# Patient Record
Sex: FEMALE | Race: OTHER | NOT HISPANIC OR LATINO | Employment: UNEMPLOYED | URBAN - METROPOLITAN AREA
[De-identification: names, ages, dates, MRNs, and addresses within clinical notes are randomized per-mention and may not be internally consistent; named-entity substitution may affect disease eponyms.]

---

## 2024-02-14 ENCOUNTER — HOSPITAL ENCOUNTER (INPATIENT)
Facility: HOSPITAL | Age: 69
LOS: 1 days | Discharge: HOME | DRG: 083 | End: 2024-02-15
Attending: EMERGENCY MEDICINE | Admitting: SURGERY

## 2024-02-14 ENCOUNTER — APPOINTMENT (EMERGENCY)
Dept: RADIOLOGY | Facility: HOSPITAL | Age: 69
DRG: 083 | End: 2024-02-14

## 2024-02-14 ENCOUNTER — APPOINTMENT (INPATIENT)
Dept: RADIOLOGY | Facility: HOSPITAL | Age: 69
DRG: 083 | End: 2024-02-14

## 2024-02-14 DIAGNOSIS — S06.6XAA SUBARACHNOID HEMATOMA (HCC): Primary | ICD-10-CM

## 2024-02-14 DIAGNOSIS — T14.8XXA CONTUSION: ICD-10-CM

## 2024-02-14 DIAGNOSIS — S09.90XA HEAD INJURY: ICD-10-CM
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
## 2024-02-14 PROBLEM — F03.90 DEMENTIA (HCC): Status: ACTIVE | Noted: 2024-02-14

## 2024-02-14 PROBLEM — I61.9 ICH (INTRACEREBRAL HEMORRHAGE) (HCC): Status: ACTIVE | Noted: 2024-02-14

## 2024-02-14 PROBLEM — I60.9 SAH (SUBARACHNOID HEMORRHAGE) (HCC): Status: ACTIVE | Noted: 2024-02-14

## 2024-02-14 LAB
ALBUMIN SERPL BCP-MCNC: 4.4 G/DL (ref 3.5–5)
ALP SERPL-CCNC: 56 U/L (ref 34–104)
ALT SERPL W P-5'-P-CCNC: 29 U/L (ref 7–52)
ANION GAP SERPL CALCULATED.3IONS-SCNC: 8 MMOL/L
AST SERPL W P-5'-P-CCNC: 26 U/L (ref 13–39)
BASOPHILS # BLD AUTO: 0.05 THOUSANDS/ÂΜL (ref 0–0.1)
BASOPHILS NFR BLD AUTO: 0 % (ref 0–1)
BILIRUB SERPL-MCNC: 0.57 MG/DL (ref 0.2–1)
BUN SERPL-MCNC: 13 MG/DL (ref 5–25)
CA-I BLD-SCNC: 1.07 MMOL/L (ref 1.12–1.32)
CALCIUM SERPL-MCNC: 9.5 MG/DL (ref 8.4–10.2)
CHLORIDE SERPL-SCNC: 103 MMOL/L (ref 96–108)
CO2 SERPL-SCNC: 28 MMOL/L (ref 21–32)
CREAT SERPL-MCNC: 0.49 MG/DL (ref 0.6–1.3)
EOSINOPHIL # BLD AUTO: 0 THOUSAND/ÂΜL (ref 0–0.61)
EOSINOPHIL NFR BLD AUTO: 0 % (ref 0–6)
ERYTHROCYTE [DISTWIDTH] IN BLOOD BY AUTOMATED COUNT: 12.5 % (ref 11.6–15.1)
GFR SERPL CREATININE-BSD FRML MDRD: 101 ML/MIN/1.73SQ M
GLUCOSE SERPL-MCNC: 130 MG/DL (ref 65–140)
HCT VFR BLD AUTO: 42.6 % (ref 34.8–46.1)
HGB BLD-MCNC: 13.4 G/DL (ref 11.5–15.4)
IMM GRANULOCYTES # BLD AUTO: 0.06 THOUSAND/UL (ref 0–0.2)
IMM GRANULOCYTES NFR BLD AUTO: 0 % (ref 0–2)
LYMPHOCYTES # BLD AUTO: 0.98 THOUSANDS/ÂΜL (ref 0.6–4.47)
LYMPHOCYTES NFR BLD AUTO: 5 % (ref 14–44)
MAGNESIUM SERPL-MCNC: 2.2 MG/DL (ref 1.9–2.7)
MCH RBC QN AUTO: 30.4 PG (ref 26.8–34.3)
MCHC RBC AUTO-ENTMCNC: 31.5 G/DL (ref 31.4–37.4)
MCV RBC AUTO: 97 FL (ref 82–98)
MONOCYTES # BLD AUTO: 0.33 THOUSAND/ÂΜL (ref 0.17–1.22)
MONOCYTES NFR BLD AUTO: 2 % (ref 4–12)
NEUTROPHILS # BLD AUTO: 17.03 THOUSANDS/ÂΜL (ref 1.85–7.62)
NEUTS SEG NFR BLD AUTO: 93 % (ref 43–75)
NRBC BLD AUTO-RTO: 0 /100 WBCS
PHOSPHATE SERPL-MCNC: 3.4 MG/DL (ref 2.3–4.1)
PLATELET # BLD AUTO: 257 THOUSANDS/UL (ref 149–390)
PMV BLD AUTO: 9.8 FL (ref 8.9–12.7)
POTASSIUM SERPL-SCNC: 3.9 MMOL/L (ref 3.5–5.3)
PROT SERPL-MCNC: 7.7 G/DL (ref 6.4–8.4)
RBC # BLD AUTO: 4.41 MILLION/UL (ref 3.81–5.12)
SODIUM SERPL-SCNC: 139 MMOL/L (ref 135–147)
WBC # BLD AUTO: 18.45 THOUSAND/UL (ref 4.31–10.16)

## 2024-02-14 PROCEDURE — G1004 CDSM NDSC: HCPCS

## 2024-02-14 PROCEDURE — 70498 CT ANGIOGRAPHY NECK: CPT

## 2024-02-14 PROCEDURE — 83735 ASSAY OF MAGNESIUM: CPT

## 2024-02-14 PROCEDURE — 84100 ASSAY OF PHOSPHORUS: CPT

## 2024-02-14 PROCEDURE — 99291 CRITICAL CARE FIRST HOUR: CPT

## 2024-02-14 PROCEDURE — 92610 EVALUATE SWALLOWING FUNCTION: CPT

## 2024-02-14 PROCEDURE — 93005 ELECTROCARDIOGRAM TRACING: CPT

## 2024-02-14 PROCEDURE — 72125 CT NECK SPINE W/O DYE: CPT

## 2024-02-14 PROCEDURE — 82607 VITAMIN B-12: CPT | Performed by: NURSE PRACTITIONER

## 2024-02-14 PROCEDURE — 99255 IP/OBS CONSLTJ NEW/EST HI 80: CPT | Performed by: NURSE PRACTITIONER

## 2024-02-14 PROCEDURE — 85025 COMPLETE CBC W/AUTO DIFF WBC: CPT

## 2024-02-14 PROCEDURE — 80053 COMPREHEN METABOLIC PANEL: CPT

## 2024-02-14 PROCEDURE — 99255 IP/OBS CONSLTJ NEW/EST HI 80: CPT | Performed by: NEUROLOGICAL SURGERY

## 2024-02-14 PROCEDURE — 99291 CRITICAL CARE FIRST HOUR: CPT | Performed by: EMERGENCY MEDICINE

## 2024-02-14 PROCEDURE — 84443 ASSAY THYROID STIM HORMONE: CPT | Performed by: NURSE PRACTITIONER

## 2024-02-14 PROCEDURE — 36415 COLL VENOUS BLD VENIPUNCTURE: CPT

## 2024-02-14 PROCEDURE — 70496 CT ANGIOGRAPHY HEAD: CPT

## 2024-02-14 PROCEDURE — 99223 1ST HOSP IP/OBS HIGH 75: CPT | Performed by: SURGERY

## 2024-02-14 PROCEDURE — NC001 PR NO CHARGE: Performed by: SURGERY

## 2024-02-14 PROCEDURE — 82330 ASSAY OF CALCIUM: CPT

## 2024-02-14 PROCEDURE — 70450 CT HEAD/BRAIN W/O DYE: CPT

## 2024-02-14 RX ORDER — LEVETIRACETAM 500 MG/1
500 TABLET ORAL EVERY 12 HOURS SCHEDULED
Status: DISCONTINUED | OUTPATIENT
Start: 2024-02-14 | End: 2024-02-15 | Stop reason: HOSPADM

## 2024-02-14 RX ORDER — ACETAMINOPHEN 325 MG/1
650 TABLET ORAL EVERY 6 HOURS PRN
Status: DISCONTINUED | OUTPATIENT
Start: 2024-02-14 | End: 2024-02-15 | Stop reason: HOSPADM

## 2024-02-14 RX ORDER — HYDROMORPHONE HCL IN WATER/PF 6 MG/30 ML
0.2 PATIENT CONTROLLED ANALGESIA SYRINGE INTRAVENOUS
Status: DISCONTINUED | OUTPATIENT
Start: 2024-02-14 | End: 2024-02-15 | Stop reason: HOSPADM

## 2024-02-14 RX ORDER — OXYCODONE HYDROCHLORIDE 5 MG/1
5 TABLET ORAL EVERY 4 HOURS PRN
Status: DISCONTINUED | OUTPATIENT
Start: 2024-02-14 | End: 2024-02-15 | Stop reason: HOSPADM

## 2024-02-14 RX ORDER — ACETAMINOPHEN 325 MG/1
975 TABLET ORAL ONCE
Status: COMPLETED | OUTPATIENT
Start: 2024-02-14 | End: 2024-02-14

## 2024-02-14 RX ORDER — ONDANSETRON 4 MG/1
4 TABLET, ORALLY DISINTEGRATING ORAL ONCE
Status: COMPLETED | OUTPATIENT
Start: 2024-02-14 | End: 2024-02-14

## 2024-02-14 RX ORDER — ONDANSETRON 2 MG/ML
4 INJECTION INTRAMUSCULAR; INTRAVENOUS EVERY 4 HOURS PRN
Status: DISCONTINUED | OUTPATIENT
Start: 2024-02-14 | End: 2024-02-15 | Stop reason: HOSPADM

## 2024-02-14 RX ORDER — SERTRALINE HYDROCHLORIDE 25 MG/1
25 TABLET, FILM COATED ORAL DAILY
Status: DISCONTINUED | OUTPATIENT
Start: 2024-02-15 | End: 2024-02-15 | Stop reason: HOSPADM

## 2024-02-14 RX ADMIN — LEVETIRACETAM 500 MG: 500 TABLET, FILM COATED ORAL at 14:05

## 2024-02-14 RX ADMIN — LEVETIRACETAM 500 MG: 500 TABLET, FILM COATED ORAL at 20:24

## 2024-02-14 RX ADMIN — ONDANSETRON 4 MG: 4 TABLET, ORALLY DISINTEGRATING ORAL at 10:53

## 2024-02-14 RX ADMIN — IOHEXOL 100 ML: 350 INJECTION, SOLUTION INTRAVENOUS at 14:30

## 2024-02-14 RX ADMIN — ACETAMINOPHEN 975 MG: 325 TABLET, FILM COATED ORAL at 10:53

## 2024-02-14 NOTE — PROGRESS NOTES
Cervical Collar Clearance:    The patient had a CT scan of the cervical spine demonstrating no acute injury. On exam, the patient had no midline point tenderness or paresthesias/numbness/weakness in the extremities. The patient had full range of motion (was then able to flex, extend, and rotate head laterally) without pain. There were no distracting injuries and the patient was not intoxicated.      The patient's cervical spine was cleared radiologically and clinically. Cervical collar removed at this time.     Terrance Ko MD  2/14/2024 12:58 PM

## 2024-02-14 NOTE — CONSULTS
[facility]  Consult  Name: Jessica Vincent 68 y.o. female I MRN: [id number]  [facility] I Date of Admission: 2/14/2024   Date of Service: 2/14/2024 I Hospital Day: 0    Inpatient consult to Neurosurgery  Consult performed by: Amador Simon PA-C  Consult ordered by: Terrance Ko MD      Assessment/Plan   SAH (subarachnoid hemorrhage) ([facility])  Assessment & Plan  Patient presented s/p slip and fall on ice backwards  No AC/AP   +Head strike without LOC    Imaging:   CT head 2/14/2024: Acute right frontotemporal subarachnoid hemorrhage  CTA head and neck 2/14/2024: similar small volume SAH. No vasospasm identified. Negative CTA for aneurysm AVM, LVO, dissection or stenosis.    Plan:   ongoing frequent neurological checks.   Recommend STAT CT head for decline in GCS >2 points in 1 hour.   Recommended STAT CTA to rule out aneurysm/vascular etiology given location of hemorrhage    Recommend repeat CT head tomorrow for stability.   Keppra for seizure ppx per trauma team   DVT ppx: SCD's only. Recommend stable CT head prior to initiation of pharm DVT ppx.  No AC/AP reported by family   PT/OT evaluation.   Ongoing medical management and pain control per primary team   CM following for dispo planning  Neurosurgery will continue to follow. Call with questions or concerns.            History of Present Illness   HPI: Jessica Vincent is a 68 y.o. female with PMH including dementia who presents after a witnessed fall. Patient lives with family who are at bedside today. She was ambulating in the hallway when she fell backwards after she slipped striking the back of her head on the ground. She has a small scalp abrasion with petechial bleeding and underlying small hematoma. Following the fall she was noted to have a headache and nausea with small amount of vomiting. She has baseline dementia which was reported to be present for about the last year since the death of her . She has no  other significant medical issues at this time. She is pleasantly confused with family providing some translation. She is still somewhat independent at home with her activities.    Prior to the fall patient did not report any issues or symptoms including a headache. She does have a few prior falls but does not use a cane or walker for ambulation at baseline. Family denies any urinary symptoms including incontinence.     Review of Systems   Constitutional:  Negative for activity change and fatigue.   HENT: Negative.     Respiratory: Negative.     Cardiovascular: Negative.    Gastrointestinal:  Positive for nausea and vomiting (previously. non since presentation).   Musculoskeletal: Negative.    Skin:  Positive for wound (scalp abrasion with superficial bleeding).   Neurological:  Positive for headaches.   Psychiatric/Behavioral:  Negative for agitation, behavioral problems and confusion.        Historical Information   Past Medical History:   Diagnosis Date    Dementia (HCC)     Difficulty in swallowing      History reviewed. No pertinent surgical history.  Social History     Substance and Sexual Activity   Alcohol Use None     Social History     Substance and Sexual Activity   Drug Use Not on file     Social History     Tobacco Use   Smoking Status Never   Smokeless Tobacco Never     Family History   Problem Relation Age of Onset    Dementia Mother        Meds/Allergies   all current active meds have been reviewed, current meds:   Current Facility-Administered Medications   Medication Dose Route Frequency    acetaminophen (TYLENOL) tablet 650 mg  650 mg Oral Q6H PRN    HYDROmorphone HCl (DILAUDID) injection 0.2 mg  0.2 mg Intravenous Q3H PRN    levETIRAcetam (KEPPRA) tablet 500 mg  500 mg Oral Q12H TOY    ondansetron (ZOFRAN) injection 4 mg  4 mg Intravenous Q4H PRN    oxyCODONE (ROXICODONE) IR tablet 5 mg  5 mg Oral Q4H PRN    oxyCODONE (ROXICODONE) split tablet 2.5 mg  2.5 mg Oral Q4H PRN    [START ON 2/15/2024]  sertraline (ZOLOFT) tablet 25 mg  25 mg Oral Daily   , and PTA meds:   None     No Known Allergies    Objective   I/O       None            Physical Exam  Constitutional:       Appearance: Normal appearance. She is well-developed.   HENT:      Head: Normocephalic and atraumatic.   Eyes:      Extraocular Movements: Extraocular movements intact and EOM normal.      Pupils: Pupils are equal, round, and reactive to light.   Neck:      Vascular: No JVD.      Trachea: No tracheal deviation.   Cardiovascular:      Rate and Rhythm: Normal rate.   Musculoskeletal:         General: No tenderness or deformity. Normal range of motion.      Cervical back: Normal range of motion and neck supple.   Skin:     General: Skin is warm and dry.   Neurological:      Mental Status: She is alert. Mental status is at baseline. She is disoriented.      Cranial Nerves: No cranial nerve deficit.      Sensory: No sensory deficit.      Motor: No weakness.      Deep Tendon Reflexes: Reflexes are normal and symmetric.   Psychiatric:         Speech: Speech normal.         Behavior: Behavior normal.         Thought Content: Thought content normal.       Neurologic Exam     Mental Status   Attention: decreased. Concentration: decreased.   Speech: speech is normal   Level of consciousness: alert  Knowledge: good and poor. Unable to perform simple calculations.   Abnormal comprehension.   Patient oriented to name and location being the hospital only. Not oriented to time or  although can sate her age accurately.      Cranial Nerves     CN III, IV, VI   Pupils are equal, round, and reactive to light.  Extraocular motions are normal.   Upgaze: normal  Downgaze: normal    CN V   Facial sensation intact.     CN VII   Facial expression full, symmetric.     CN VIII   CN VIII normal.   Hearing: intact    CN XII   CN XII normal.   Tongue deviation: none    Motor Exam   Muscle bulk: normal  Right arm tone: normal  Left arm tone: normal  Right leg tone:  "normal  Left leg tone: normalStrength grossly intact. Globally weak with 4/5 strength throughout      Sensory Exam   Light touch normal.     Gait, Coordination, and Reflexes     Tremor   Resting tremor: absent  Action tremor: absent    Vitals:Blood pressure 155/82, pulse 71, temperature 97.7 °F (36.5 °C), resp. rate 18, SpO2 97%.,There is no height or weight on file to calculate BMI.     Lab Results:   Results from last 7 days   Lab Units 02/14/24  1327   WBC Thousand/uL 18.45*   HEMOGLOBIN g/dL 13.4   HEMATOCRIT % 42.6   PLATELETS Thousands/uL 257   NEUTROS PCT % 93*   MONOS PCT % 2*   EOS PCT % 0     Results from last 7 days   Lab Units 02/14/24  1327   SODIUM mmol/L 139   POTASSIUM mmol/L 3.9   CHLORIDE mmol/L 103   CO2 mmol/L 28   BUN mg/dL 13   CREATININE mg/dL 0.49*   CALCIUM mg/dL 9.5   ALK PHOS U/L 56   ALT U/L 29   AST U/L 26     Results from last 7 days   Lab Units 02/14/24  1327   MAGNESIUM mg/dL 2.2     Results from last 7 days   Lab Units 02/14/24  1327   PHOSPHORUS mg/dL 3.4         No results found for: \"TROPONINT\"  ABG:No results found for: \"PHART\", \"FJJ9FZI\", \"PO2ART\", \"BBV3ECM\", \"T0WSFPEU\", \"BEART\", \"SOURCE\"    Imaging Studies: I have personally reviewed pertinent reports.   and I have personally reviewed pertinent films in PACS    CT head without contrast    Result Date: 2/14/2024  Impression: Acute right frontotemporal subarachnoid hemorrhage. No skull fracture. Mild chronic microangiopathy. I personally discussed this study with TONE HAMILTON on 2/14/2024 at 12:38 PM. Workstation performed: [id number]     CT spine cervical without contrast    Result Date: 2/14/2024  Impression: No cervical spine fracture or traumatic malalignment. Workstation performed: [id number]       EKG, Pathology, and Other Studies: I have personally reviewed pertinent reports.      VTE Prophylaxis: Sequential compression device (Venodyne)  and Reason for no pharmacologic prophylaxis SAH    Code Status: Level 1 - Full " Code  Advance Directive and Living Will:      Power of :    POLST:      Counseling / Coordination of Care  I spent 30 minutes with the patient.

## 2024-02-14 NOTE — ASSESSMENT & PLAN NOTE
Secondary to fall  CT head: Acute right frontotemporal subarachnoid hemorrhage.  CTA Head Neck: No aneurysm   Admitted HOT, Continue neuro checks  Reversal agent administered: Patient does not take anti-platelet or anti-coagulant medications. No reversal agent indicated.   F/u CT head: 2/15/2024 Shows stability  Appreciate neurosurgery recommendations  Complete 7 day course of Keppra for seizure prophylaxis  DVT Prophylaxis started  Hold all anticoagulants and anti platelet medications until cleared by neurosurgery to resume  PT/OT and cognitive evaluation   negative

## 2024-02-14 NOTE — ASSESSMENT & PLAN NOTE
Patient presented s/p slip and fall on ice backwards  No AC/AP   +Head strike without LOC    Imaging:   CT head 2/14/2024: Acute right frontotemporal subarachnoid hemorrhage  CTA head and neck 2/14/2024: similar small volume SAH. No vasospasm identified. Negative CTA for aneurysm AVM, LVO, dissection or stenosis.    Plan:   ongoing frequent neurological checks.   Recommend STAT CT head for decline in GCS >2 points in 1 hour.   Recommended STAT CTA to rule out aneurysm/vascular etiology given location of hemorrhage    Recommend repeat CT head tomorrow for stability.   Keppra for seizure ppx per trauma team   DVT ppx: SCD's only. Recommend stable CT head prior to initiation of pharm DVT ppx.  No AC/AP reported by family   PT/OT evaluation.   Ongoing medical management and pain control per primary team   CM following for dispo planning  Neurosurgery will continue to follow. Call with questions or concerns.

## 2024-02-14 NOTE — CONSULTS
"Consultation - Geriatrics   Jessica Vincent 68 y.o. female MRN: [id number]  Unit/Bed#: [facility] 634-01 Encounter: [id number]      Assessment/Plan  Dementia  No prior work up, per family \"pt has dementia\"   Mother had dementia was diagnosed in her 80s.  She has repetition, wandering, needs cuing for bathing  Family reports memory issues started after the passing of patients   Cannot exclude depression as contributing cause of memory issues  CT head (2/14/24): chronic microangiopathy  Recommend check TSH and Vitamin B 12  Recommend start of zoloft 25 mg po daily  Additionally recommend formal comprehensive work up as outpatient    Depression  Started after loss of    Can be cause of pseduodementia  Recommend start of zoloft 25 mg po QHS    Ambulatory dysfunction  At risk for falls secondary to age, hx of fall, gait instability, polypharmacy, visual impairment  Fall precautions  PT/OT  Increased physical exercise, recommend balance and strengthening exercises  Rehab post hospitalization     Acute pain due to trauma  Geriatric pain protocol  Scheduled acetaminophen 975 mg po Q8  Oxycodone 2.5 mg po Q 4 prn moderate pain  Oxycodone 5 mg po Q 4 prn severe pain   Monitor for constipation  Lidoderm patch     Right frontal temporal subarachnoid hemorrhage  status post fall, slipped on ice  Neurosurgery on consult  Kera for seizure ppx    Difficulty swallowing  Per daughter patient has had difficulty swallowing x 2 months  Speech on consult     Frailty  Clinical Frail Scale: 5- Mildly Frail  Lives with supportive family  Albumin 4.4, maintain protein in diet  PT/OT  Continue supportive care     Delirium precautions  Baseline: alert and oriented x 3  Provide frequent redirection, reorientation, distraction techniques  Avoid deliriogenic medications such as tramadol, benzodiazepines, anticholinergics,  Benadryl  Treat pain, See geriatric pain protocol  Monitor for constipation and urinary retention  Encourage early and " frequent moblization, OOB  Encourage Hydration/ Nutrition  Implement sleep hygiene, limit night time interuptions, group activities  Recommend seroquel 12.5 mg po QHS for agitation, wandering  Qtc 477 (2/14/24)     Advanced Care Planning  Full code    Home medication review  Per daughter no medications prior to arrival        History of Present Illness   Physician Requesting Consult: No att. providers found  Reason for Consult / Principal Problem: ISAR greater than 2  Hx and PE limited by: dementia  HPI: Jessica Vincent is a 68 y.o. year old female who presents with headache and nausea after a fall. She was walking on the sidewalk when she slipped on ice and fell.  He fell backwards and hit her head.  She had loss of consciousness.  She reported feeling dizzy and nauseous.  He is brought to the emergency room by her daughter for evaluation.    She has dementia, difficultly swallowing.    Prior to arrival she resides with her daughter.  She is lives with her daughter since August prior to August she lived in [place].  Is coming to [place] she has not been to see a doctor.  Daughter reports she was diagnosed with dementia in [place].  Her memory issues began after the passing of her  about 2 years ago.  Her daughter assists with IADLs.  She is independent with ADLs but she does need reminders to bathe and dress appropriately.  She ambulates independently.  No reported issues with vision, hearing or dentition.  Daughter reports she has difficulty swallowing.  Daughter reports that she does not eat liquids fruits or vegetables.  She mostly eats bread.  Sleeps well.  She is continent of bowel and bladder.  Daughter reports that she has had an incident where she did leave the house and wander.  She likes to go for walks.    Upon exam she is lying in stretcher.  She is touching her scalp which is oozing blood.  Daughter at bedside to translate and review HPI.    Inpatient consult to Gerontology  Consult performed by:  MAX Chan  Consult ordered by: Terrance Ko MD          Review of Systems    Historical Information   Past Medical History:   Diagnosis Date    Dementia (HCC)     Difficulty in swallowing       History reviewed. No pertinent surgical history. None   Social History   Social History     Substance and Sexual Activity   Alcohol Use None     Social History     Substance and Sexual Activity   Drug Use Not on file     Social History     Tobacco Use   Smoking Status Never   Smokeless Tobacco Never         Family History:   Family History   Problem Relation Age of Onset    Dementia Mother     Mother had dementia at age 80    Meds/Allergies   Current meds:   Current Facility-Administered Medications   Medication Dose Route Frequency    acetaminophen (TYLENOL) tablet 650 mg  650 mg Oral Q6H PRN    HYDROmorphone HCl (DILAUDID) injection 0.2 mg  0.2 mg Intravenous Q3H PRN    levETIRAcetam (KEPPRA) tablet 500 mg  500 mg Oral Q12H TOY    ondansetron (ZOFRAN) injection 4 mg  4 mg Intravenous Q4H PRN    oxyCODONE (ROXICODONE) IR tablet 5 mg  5 mg Oral Q4H PRN    oxyCODONE (ROXICODONE) split tablet 2.5 mg  2.5 mg Oral Q4H PRN    [START ON 2/15/2024] sertraline (ZOLOFT) tablet 25 mg  25 mg Oral Daily             No Known Allergies    Objective   Vitals: Blood pressure 155/82, pulse 71, temperature 97.7 °F (36.5 °C), resp. rate 18, SpO2 97%.,There is no height or weight on file to calculate BMI.      Physical Exam    Lab Results:   Results from last 7 days   Lab Units 02/14/24  1327   WBC Thousand/uL 18.45*   HEMOGLOBIN g/dL 13.4   HEMATOCRIT % 42.6   PLATELETS Thousands/uL 257        Results from last 7 days   Lab Units 02/14/24  1327   POTASSIUM mmol/L 3.9   CHLORIDE mmol/L 103   CO2 mmol/L 28   BUN mg/dL 13   CREATININE mg/dL 0.49*   CALCIUM mg/dL 9.5   ALK PHOS U/L 56   ALT U/L 29   AST U/L 26       Imaging Studies: I have personally reviewed pertinent films in PACS  EKG, Pathology, and Other Studies: I have  personally reviewed pertinent films in PACS  VTE Prophylaxis: Sequential compression device (Venodyne)     Code Status: Level 1 - Full Code

## 2024-02-14 NOTE — ED PROVIDER NOTES
History  Chief Complaint   Patient presents with    Fall     Fall on ice this morning. +HS - thinners. Brief LOC. +N/V Lac on R side of head. Bleeding controlled with gauze     Patient is a 68-year-old female with past medical history of dementia who presents emergency department following fall.  Patient is present with daughter, patient speaks no English only Mandarin, daughter speaks English and is giving history for her.  Daughter reports that the mother lives at home with her, reports her baseline function is ambulatory, able to shower/eat, cares for herself, but is confused and minimally verbal at baseline.  Daughter reports that this morning they were outside in the driveway together, going to get in the car, mother slipped and fell, struck her head, daughter reports that she did not lose consciousness, but it is difficult to tell because she is slow to respond with her baseline dementia.  Reports that she is not on a blood thinner, no aspirin, does not take any medications at home, since the event she has complained of some nausea but has not vomited, she has complained of headache, has not complained of any kind of visual change.  Has not taken any kind of medication to treat her pain.    No complaint of chest pain, shortness of breathing, rash, skin change, feelings of palpitation, fevers, sore throat/cough/congestion.        None       Past Medical History:   Diagnosis Date    Dementia (HCC)     Difficulty in swallowing        History reviewed. No pertinent surgical history.    Family History   Problem Relation Age of Onset    Dementia Mother      I have reviewed and agree with the history as documented.    E-Cigarette/Vaping     E-Cigarette/Vaping Substances    Nicotine No     THC No     CBD No     Flavoring No     Other No     Unknown No      Social History     Tobacco Use    Smoking status: Never    Smokeless tobacco: Never        Review of Systems    Physical Exam  ED Triage Vitals   Temperature Pulse  Respirations Blood Pressure SpO2   02/14/24 0953 02/14/24 0953 02/14/24 0953 02/14/24 0953 02/14/24 0953   (!) 96.9 °F (36.1 °C) 66 16 125/81 100 %      Temp Source Heart Rate Source Patient Position - Orthostatic VS BP Location FiO2 (%)   02/14/24 0953 02/14/24 1330 02/14/24 0953 -- --   Temporal Monitor Lying        Pain Score       02/14/24 1053       6             Orthostatic Vital Signs  Vitals:    02/14/24 2212 02/15/24 0237 02/15/24 0700 02/15/24 1018   BP: 101/58 118/65 120/60 118/64   Pulse: 72 66  73   Patient Position - Orthostatic VS:           Physical Exam  Vitals and nursing note reviewed.   Constitutional:       General: She is not in acute distress.     Appearance: She is well-developed.   HENT:      Head: Normocephalic and atraumatic.      Mouth/Throat:      Mouth: Mucous membranes are moist.      Pharynx: Oropharynx is clear.   Eyes:      Extraocular Movements: Extraocular movements intact.      Conjunctiva/sclera: Conjunctivae normal.      Pupils: Pupils are equal, round, and reactive to light.   Cardiovascular:      Rate and Rhythm: Normal rate and regular rhythm.      Heart sounds: No murmur heard.  Pulmonary:      Effort: Pulmonary effort is normal. No respiratory distress.      Breath sounds: Normal breath sounds.   Abdominal:      Palpations: Abdomen is soft.      Tenderness: There is no abdominal tenderness. There is no guarding or rebound.   Musculoskeletal:         General: Swelling and signs of injury present. No tenderness or deformity.      Cervical back: Neck supple.      Comments: There is a hematoma overlying the lower right temporo/occipital region, hematoma is approximately 3 cm x 3 cm, circular, there is an overlying minor abrasion, the abrasion is well coagulated, no evidence of infectious process, no exudate, minimally erythematous, there is no bony crepitus or instability during palpation of the skull, there is no evidence of additional abrasion or laceration to the  head/skull, no evidence of active bleeding at this time   Skin:     General: Skin is warm and dry.      Capillary Refill: Capillary refill takes less than 2 seconds.      Findings: Lesion present. No bruising, erythema or rash.   Neurological:      General: No focal deficit present.      Mental Status: She is alert and oriented to person, place, and time.      Cranial Nerves: No cranial nerve deficit.      Sensory: No sensory deficit.      Motor: No weakness.   Psychiatric:         Mood and Affect: Mood normal.         ED Medications  Medications   acetaminophen (TYLENOL) tablet 975 mg (975 mg Oral Given 2/14/24 1053)   ondansetron (ZOFRAN-ODT) dispersible tablet 4 mg (4 mg Oral Given 2/14/24 1053)   iohexol (OMNIPAQUE) 350 MG/ML injection (MULTI-DOSE) 100 mL (100 mL Intravenous Given 2/14/24 1430)       Diagnostic Studies  Results Reviewed       Procedure Component Value Units Date/Time    Basic metabolic panel [[id number]]  (Abnormal) Collected: 02/15/24 0632    Lab Status: Final result Specimen: Blood from Hand, Right Updated: 02/15/24 0726     Sodium 140 mmol/L      Potassium 3.7 mmol/L      Chloride 105 mmol/L      CO2 27 mmol/L      ANION GAP 8 mmol/L      BUN 9 mg/dL      Creatinine 0.54 mg/dL      Glucose 89 mg/dL      Calcium 8.4 mg/dL      eGFR 98 ml/min/1.73sq m     Narrative:      National Kidney Disease Foundation guidelines for Chronic Kidney Disease (CKD):     Stage 1 with normal or high GFR (GFR > 90 mL/min/1.73 square meters)    Stage 2 Mild CKD (GFR = 60-89 mL/min/1.73 square meters)    Stage 3A Moderate CKD (GFR = 45-59 mL/min/1.73 square meters)    Stage 3B Moderate CKD (GFR = 30-44 mL/min/1.73 square meters)    Stage 4 Severe CKD (GFR = 15-29 mL/min/1.73 square meters)    Stage 5 End Stage CKD (GFR <15 mL/min/1.73 square meters)  Note: GFR calculation is accurate only with a steady state creatinine    CBC and differential [[id number]] Collected: 02/15/24 0632    Lab Status: Final result Specimen:  Blood from Hand, Right Updated: 02/15/24 0706     WBC 7.38 Thousand/uL      RBC 3.85 Million/uL      Hemoglobin 11.6 g/dL      Hematocrit 36.5 %      MCV 95 fL      MCH 30.1 pg      MCHC 31.8 g/dL      RDW 12.7 %      MPV 10.1 fL      Platelets 237 Thousands/uL      nRBC 0 /100 WBCs      Neutrophils Relative 74 %      Immat GRANS % 0 %      Lymphocytes Relative 20 %      Monocytes Relative 5 %      Eosinophils Relative 1 %      Basophils Relative 0 %      Neutrophils Absolute 5.48 Thousands/µL      Immature Grans Absolute 0.02 Thousand/uL      Lymphocytes Absolute 1.45 Thousands/µL      Monocytes Absolute 0.36 Thousand/µL      Eosinophils Absolute 0.04 Thousand/µL      Basophils Absolute 0.03 Thousands/µL     TSH, 3rd generation [[id number]]  (Normal) Collected: 02/14/24 1327    Lab Status: Final result Specimen: Blood from Arm, Right Updated: 02/15/24 0633     TSH 3RD GENERATON 0.903 uIU/mL     Vitamin B12 [[id number]]  (Normal) Collected: 02/14/24 1327    Lab Status: Final result Specimen: Blood from Arm, Right Updated: 02/15/24 0633     Vitamin B-12 240 pg/mL     CBC and differential [[id number]]  (Abnormal) Collected: 02/14/24 1327    Lab Status: Final result Specimen: Blood from Arm, Right Updated: 02/14/24 1413     WBC 18.45 Thousand/uL      RBC 4.41 Million/uL      Hemoglobin 13.4 g/dL      Hematocrit 42.6 %      MCV 97 fL      MCH 30.4 pg      MCHC 31.5 g/dL      RDW 12.5 %      MPV 9.8 fL      Platelets 257 Thousands/uL      nRBC 0 /100 WBCs      Neutrophils Relative 93 %      Immat GRANS % 0 %      Lymphocytes Relative 5 %      Monocytes Relative 2 %      Eosinophils Relative 0 %      Basophils Relative 0 %      Neutrophils Absolute 17.03 Thousands/µL      Immature Grans Absolute 0.06 Thousand/uL      Lymphocytes Absolute 0.98 Thousands/µL      Monocytes Absolute 0.33 Thousand/µL      Eosinophils Absolute 0.00 Thousand/µL      Basophils Absolute 0.05 Thousands/µL     Narrative:      This is an appended  report.  These results have been appended to a previously verified report.    Comprehensive metabolic panel [[id number]]  (Abnormal) Collected: 02/14/24 1327    Lab Status: Final result Specimen: Blood from Arm, Right Updated: 02/14/24 1409     Sodium 139 mmol/L      Potassium 3.9 mmol/L      Chloride 103 mmol/L      CO2 28 mmol/L      ANION GAP 8 mmol/L      BUN 13 mg/dL      Creatinine 0.49 mg/dL      Glucose 130 mg/dL      Calcium 9.5 mg/dL      AST 26 U/L      ALT 29 U/L      Alkaline Phosphatase 56 U/L      Total Protein 7.7 g/dL      Albumin 4.4 g/dL      Total Bilirubin 0.57 mg/dL      eGFR 101 ml/min/1.73sq m     Narrative:      National Kidney Disease Foundation guidelines for Chronic Kidney Disease (CKD):     Stage 1 with normal or high GFR (GFR > 90 mL/min/1.73 square meters)    Stage 2 Mild CKD (GFR = 60-89 mL/min/1.73 square meters)    Stage 3A Moderate CKD (GFR = 45-59 mL/min/1.73 square meters)    Stage 3B Moderate CKD (GFR = 30-44 mL/min/1.73 square meters)    Stage 4 Severe CKD (GFR = 15-29 mL/min/1.73 square meters)    Stage 5 End Stage CKD (GFR <15 mL/min/1.73 square meters)  Note: GFR calculation is accurate only with a steady state creatinine    Magnesium [[id number]]  (Normal) Collected: 02/14/24 1327    Lab Status: Final result Specimen: Blood from Arm, Right Updated: 02/14/24 1409     Magnesium 2.2 mg/dL     Phosphorus [[id number]]  (Normal) Collected: 02/14/24 1327    Lab Status: Final result Specimen: Blood from Arm, Right Updated: 02/14/24 1409     Phosphorus 3.4 mg/dL     Calcium, ionized [[id number]]  (Abnormal) Collected: 02/14/24 1327    Lab Status: Final result Specimen: Blood from Arm, Right Updated: 02/14/24 1348     Calcium, Ionized 1.07 mmol/L                    CT head wo contrast   Final Result by Fernando Monique MD (02/15 0817)      Improving subarachnoid hemorrhage within the right sylvian fissure. No new hemorrhage.                  Workstation performed: [id number]         CTA head  and neck w wo contrast   Final Result by Martell Grover MD (02/14 6657)      Similar acute small volume slightly thickened subarachnoid hemorrhage throughout entire right sylvian fissure surrounding right MCA M1, M2, and posterior M3 segments. No vasospasm identified.      Negative CTA head and neck for aneurysm, arteriovenous vascular malformation, large vessel occlusion, dissection, or high-grade stenosis.      Asymmetric contrast opacification of right cavernous sinus, likely due to arterial bolus timing. Difficult to exclude right carotid cavernous fistula. Recommend consultation with the [facility], a [facility] of [facility]    at [phone].      Mild atherosclerotic disease of the head and neck, as detailed above.      The study was marked in EPIC for immediate notification.                        Workstation performed: [id number]         CT head without contrast   Final Result by Joe Lester MD (02/14 1238)      Acute right frontotemporal subarachnoid hemorrhage.      No skull fracture.      Mild chronic microangiopathy.         I personally discussed this study with TONE HAMILTON on 2/14/2024 at 12:38 PM.            Workstation performed: [id number]         CT spine cervical without contrast   Final Result by Joe Lester MD (02/14 1230)      No cervical spine fracture or traumatic malalignment.                  Workstation performed: [id number]         CT head wo contrast    (Results Pending)         Procedures  Procedures      ED Course  ED Course as of 02/15/24 2058   Wed Feb 14, 2024   1221 CT head returns, concern for right side intraparenchymal versus subarachnoid hemorrhage, spoke with the trauma attending, spoke with the trauma resident, will evaluate in the emergency department, at this time, patient has remained hemodynamically stable with no symptoms/complaint, no nausea/vomiting at this time                              SBIRT 20yo+      Flowsheet Row Most Recent Value   Initial Alcohol Screen: US AUDIT-C     1. How often do you have a drink containing alcohol? 0 Filed at: 02/14/2024 0957   Audit-C Score 0 Filed at: 02/14/2024 0957                  Medical Decision Making  Patient is a 68 y.o. female with PMH of dementia who presents to the ED with complaint of recent fall.    Vital signs on arrival within normal limits. On exam patient seen in room, alert, oriented, skinny/frail, patient has large hematoma to the right parietal temporal region, tender to palpation over the hematoma, no evidence of depressed skull fracture/bony movement, no crepitus, there is a abrasion overlying is well coagulated, no evidence of infection, no open wounds/lacerations, no midline neck/spine pain, extraocular movements normal, visual acuity intact, no focal neurological deficits, strength intact in the bilateral upper and lower extremities, pupils are PERRL, remainder the physical exam is benign.    History and physical exam most consistent with right sided hematoma, possible skull fracture. However, differential diagnosis included but not limited to intracranial abnormality including but not limited to hemorrhage. Plan CT head/CT neck for complaint of recent fall with new onset nausea/vomiting, treat patient's pain with administration of Tylenol, Zofran 4 mg for complaint of nausea.    View ED course above for further discussion on patient workup.     CT head demonstrating evidence of subarachnoid hemorrhage, will consult trauma team.     I spoke with the trauma attending, spoke with the trauma resident, will see and evaluate patient in the emergency department.     Patient seen and evaluated by the trauma resident, agree with admission of the patient, agree with plan for patient, no reversal agents necessary at this time, has remained hemodynamically stable, radiology calls with official read, no evidence of skull  fracture or intraparenchymal hemorrhage, right frontotemporal subarachnoid hemorrhage identified.    All labs reviewed and utilized in the medical decision making process  All radiology studies independently viewed by me and interpreted by the radiologist.  I reviewed all testing with the patient.     Upon re-evaluation patient continues to remain alert, oriented, no evidence of worsening intracranial abnormality, hemodynamically stable, no evidence of respiratory distress, no alteration of mental status, no change in neuroexam.     Patient is admitted to trauma has been evaluated by the resident, admitted to trauma, successfully transported off the floor with issue/complaint.      Amount and/or Complexity of Data Reviewed  Radiology: ordered.    Risk  OTC drugs.  Prescription drug management.  Decision regarding hospitalization.          Disposition  Final diagnoses:   Subarachnoid hematoma (HCC)   Fall   Head injury   Contusion     Time reflects when diagnosis was documented in both MDM as applicable and the Disposition within this note       Time User Action Codes Description Comment    2/14/2024 12:45 PM Bimal Hansen Add [S06.6XAA] Subarachnoid hematoma (HCC)     2/14/2024 12:45 PM Bimal Hansen Add [W19.XXXA] Fall     2/14/2024 12:45 PM Bimal Hansen Add [S09.90XA] Head injury     2/14/2024 12:45 PM Bimal Hansen [T14.8XXA] Contusion           ED Disposition       ED Disposition   Admit    Condition   Stable    Date/Time   Wed Feb 14, 2024 1245    Comment   Case was discussed with Trauma and the patient's admission status was agreed to be Admission Status: inpatient status to the service of Dr. Mallory.               Follow-up Information       Follow up With Specialties Details Why Contact Info Additional Information    [facility] Follow up on 3/1/2024 Please complete Ct head 2-3 days prior to your office appointment. You can contact [phone] to schedule  your imaging appointment. Contact the office with any additional questions or concerns. [address]  [address]  [facility] [id number] [phone] [facility] [place], [address], [place], [place], [id number] [phone]            Discharge Medication List as of 2/15/2024 12:30 PM        START taking these medications    Details   acetaminophen (TYLENOL) 325 mg tablet Take 2 tablets (650 mg total) by mouth every 6 (six) hours as needed for mild pain, headaches or fever for up to 7 days, Starting Thu 2/15/2024, Until Thu 2/22/2024 at 2359, Normal      levETIRAcetam (KEPPRA) 500 mg tablet Take 1 tablet (500 mg total) by mouth every 12 (twelve) hours for 11 doses, Starting Thu 2/15/2024, Until Wed 2/21/2024, Normal      sertraline (ZOLOFT) 25 mg tablet Take 1 tablet (25 mg total) by mouth daily, Starting Fri 2/16/2024, Until Tue 4/16/2024, Normal           Outpatient Discharge Orders   CT head wo contrast   Standing Status: Future Standing Exp. Date: 02/15/28     Ambulatory Referral to [facility] Care   Standing Status: Future Standing Exp. Date: 02/15/25      Discharge Diet     Activity as tolerated     No driving until     Call provider for:  persistent nausea or vomiting     Call provider for:  severe uncontrolled pain     Call provider for:  difficulty breathing, headache or visual disturbances     Call provider for:  hives     Call provider for:  persistent dizziness or light-headedness     Call provider for:  extreme fatigue     No dressing needed       PDMP Review       None             ED Provider  Attending physically available and evaluated Jessica Vincent. I managed the patient along with the ED Attending.    Electronically Signed by           Bimal Hansen DO  02/15/24 1967

## 2024-02-14 NOTE — ED ATTENDING ATTESTATION
2/14/2024  ISundar DO, saw and evaluated the patient. I have discussed the patient with the resident/non-physician practitioner and agree with the resident's/non-physician practitioner's findings, Plan of Care, and MDM as documented in the resident's/non-physician practitioner's note, except where noted. All available labs and Radiology studies were reviewed.  I was present for key portions of any procedure(s) performed by the resident/non-physician practitioner and I was immediately available to provide assistance.       At this point I agree with the current assessment done in the Emergency Department.  I have conducted an independent evaluation of this patient a history and physical is as follows:    68-year-old female presents for evaluation after mechanical slip and fall on ice this morning.  She did strike the right side of her head and had a brief loss of consciousness.  Since then, she has had nausea and did vomit.  She has an abrasion on her right scalp that was bandaged prior to arrival.  She does have some baseline dementia and is otherwise a poor historian.    ROS: Denies visual change, LH/dizziness, HA, midline neck or back pain, CP, SOB, abdominal pain, n/v. 12 system ROS o/w unobtainable due to dementia.    PE: NAD, appears comfortable, alert, GCS 15; PERRL, EOMI; no hemotympanum; no septal hematoma or epistaxis; MMM, no post OP exudate, edema or erythema, no dental trauma; no midline vert TTP, no crepitus or step-offs; HRR, no murmur; lungs CTA w/o w/r/r, POx 100% on RA (nl); abd s/nt/nd, nl BS in all four quadrants; (-) LE edema, no calf TTP, stable pelvis, FROM extremities x4, strength and sensation appear intact; skin on right side of scalp has small area of abrasion with controlled bleeding, skin is otherwise p/w/d; CN II-XII GI/NF, oriented.    MDM/DDx: Fall with head injury - abrasion, contusion, at risk for ICH, less likely fracture.     I independently reviewed and interpreted  ordered imaging from this encounter.    A/P: Will check CT head and C-spine, treat symptoms, reevaluate for further work up and disposition.    ED Course         Critical Care Time  CriticalCare Time    Date/Time: 2/14/2024 11:38 AM    Performed by: Sundar Vieyra DO  Authorized by: Sundar Vieyra DO    Critical care provider statement:     Critical care time (minutes):  30    Critical care time was exclusive of:  Separately billable procedures and treating other patients and teaching time    Critical care was necessary to treat or prevent imminent or life-threatening deterioration of the following conditions:  Trauma    Critical care was time spent personally by me on the following activities:  Obtaining history from patient or surrogate, development of treatment plan with patient or surrogate, discussions with consultants, evaluation of patient's response to treatment, examination of patient, ordering and performing treatments and interventions, ordering and review of laboratory studies, ordering and review of radiographic studies, re-evaluation of patient's condition and review of old charts    I assumed direction of critical care for this patient from another provider in my specialty: no

## 2024-02-14 NOTE — SPEECH THERAPY NOTE
Speech-Language Pathology Bedside Swallow Evaluation      Patient Name: Jessica Vincent    Today's Date: 2/14/2024     Problem List  Active Problems:    Fall    ICH (intracerebral hemorrhage) (HCC)    Dementia (HCC)    SAH (subarachnoid hemorrhage) (HCC)      Past Medical History  Past Medical History:   Diagnosis Date    Dementia (HCC)     Difficulty in swallowing        Past Surgical History  History reviewed. No pertinent surgical history.    Summary   Pt presented with functional appearing oral and pharyngeal stage swallowing skills with materials administered today. Pt is assessed with puree solids, soft solids and thin liquids. She is accompanied by a family friend at bedside, and is only able to partially translate. She is able to independently feed herself. Bite size is adequate. Speed of intake is increased. Mastication and transfer are timely and efficient. Thin liquids via straw is adequate. Collection of oral residue observed in the left cheek. Liquid wash helps to clear. No overt s/s of aspiration observed.     Risk/s for Aspiration: low     Recommended Diet: mechanically altered/level 2 diet and thin liquids   Recommended Form of Meds: whole with liquid   Aspiration precautions and swallowing strategies: upright posture and slow rate of feeding  Other Recommendations: Continue frequent oral care    Current Medical Status  Jessica Vincent is a 68 y.o. female who presents with nausea and headache after a fall.  The patient is accompanied by 2 of her family members.  The patient's H&P was obtained from her family members.  The patient's daughter states that the patient was walking on the sidewalk and slipped and fell on ice.  She fell backwards and had head strike along with loss of consciousness.  Immediately after the fall the patient did feel dizzy.  She also endorsed nausea.  Patient denies having vomiting, fevers, chills, chest pain, shortness of breath.  Denies any dysuria.  Has cognitive deficits at baseline  secondary to dementia.  Patient is not on any forms of antiplatelet or anticoagulation therapy.  At baseline the patient is ambulatory on her own without a walker or cane.  She does have difficulty with swallowing solids and liquids orally.  Sometimes she does regurgitate food.  Patient has no significant past medical or past surgical history.     Current Precautions:  Aspiration     Allergies:  No known food allergies    Past medical history:  Please see H&P for details    Special Studies:  CT head 2/14/24-   IMPRESSION:     Acute right frontotemporal subarachnoid hemorrhage.     No skull fracture.     Mild chronic microangiopathy.    Social/Education/Vocational Hx:  Pt lives with family    Swallow Information   Current Risks for Dysphagia & Aspiration:  previous fall  Current Symptoms/Concerns:  none observed  Current Diet:  no diet ordered     Baseline Diet: regular diet and thin liquids    Baseline Assessment   Behavior/Cognition: alert  Speech/Language Status: able to follow commands inconsistently and verbal output in Mandarin  Patient Positioning: upright in bed  Pain Status/Interventions/Response to Interventions: No report of or nonverbal indications of pain.    Swallow Mechanism Exam  Facial: symmetrical  Labial: WFL  Lingual: WFL  Velum: unable to visualize  Mandible: adequate ROM  Dentition:  missing teeth  Vocal quality:clear/adequate   Respiratory Status: on RA       Consistencies Assessed and Performance   Consistencies Administered: thin liquids, puree, and mechanical soft solids  Materials administered included thin liquids, applesauce and sky crackers    Oral Stage: WFL  Mastication was adequate with the materials administered today.  Bolus formation and transfer were functional. Collection of oral residue observed in left cheek. Liquid wash helps to clear.  No overt s/s reduced oral control.    Pharyngeal Stage: WFL  Swallow Mechanics:  Swallowing initiation appeared prompt.  Laryngeal rise was  palpated and judged to be within functional limits.  No coughing, throat clearing, change in vocal quality or respiratory status noted today.     Esophageal Concerns: none reported      Summary and Recommendations (see above)    Results Reviewed with: RN, MD, and family     Treatment Recommended: Dysphagia therapy     Frequency of treatment: 2-3x week     Patient Stated Goal:  none stated    Dysphagia LTG  -Patient will demonstrate safe and effective oral intake (without overt s/s significant oral/pharyngeal dysphagia including s/s penetration or aspiration) for the highest appropriate diet level.     Short Term Goals:  -Pt will tolerate Dysphagia 2/mechanical soft diet and thin liquid with no significant s/s oral or pharyngeal dysphagia across 1-3 diagnostic session/s.    -Patient will tolerate trials of upgraded food and/or liquid texture with no significant s/s of oral or pharyngeal dysphagia including aspiration across 1-3 diagnostic sessions       Speech Therapy Prognosis   Prognosis: good    Prognosis Considerations: medical status and prior medical history

## 2024-02-14 NOTE — H&P
H&P - Trauma   Jessica Vincent 68 y.o. female MRN: [id number]  Unit/Bed#: [facility] Encounter: [id number]    Trauma Alert: Evaluation; trauma team notified at 12:00 via text   Model of Arrival: Self    Trauma Team: Mervin Ko  Consultants:     Neurosurgery: routine consult; Epic consult order placed;     Assessment/Plan   Active Problems / Assessment:   67 yo Female who presents with nausea and vomiting after a fall from standing height in the setting of a right frontotemporal subarachnoid hemorrhage.     Plan:   Admitting to the trauma service  Will proceed with HOT protocol  Initiating Keppra 500 BID for seizure prophylaxis  Hold chemical DVT prophylaxis  Continue with mechanical DVT prophylaxis  Neurosurgery consult placed, will appreciated recs  Will initiate a diet  No labs obtained in ED, obtaining basic labs including cbc, cmp, mag, phos, calcium  Q1 hour neuro checks  Geriatrics consult  Speech consult  PT/OT evaluation    History of Present Illness     Chief Complaint: Nausea and headache  Mechanism:Fall     HPI:    Jessica Vincent is a 68 y.o. female who presents with nausea and headache after a fall.  The patient is accompanied by 2 of her family members.  The patient's H&P was obtained from her family members.  The patient's daughter states that the patient was walking on the sidewalk and slipped and fell on ice.  She fell backwards and had head strike along with loss of consciousness.  Immediately after the fall the patient did feel dizzy.  She also endorsed nausea.  Patient denies having vomiting, fevers, chills, chest pain, shortness of breath.  Denies any dysuria.  Has cognitive deficits at baseline secondary to dementia.  Patient is not on any forms of antiplatelet or anticoagulation therapy.  At baseline the patient is ambulatory on her own without a walker or cane.  She does have difficulty with swallowing solids and liquids orally.  Sometimes she does regurgitate food.  Patient has no significant past medical  or past surgical history.  See below for imaging findings.  See above for assessment and plan.    2/14 CT head non con: Acute right frontotemporal subarachnoid hemorrhage     2/14 CT c-spine w/o con: Negative    Review of Systems   Constitutional:  Positive for appetite change. Negative for activity change, chills and fever.   HENT:  Negative for congestion, facial swelling and sore throat.    Eyes:  Negative for pain and redness.   Respiratory:  Negative for cough, chest tightness and shortness of breath.    Cardiovascular:  Negative for chest pain and palpitations.   Gastrointestinal:  Negative for abdominal pain.   Endocrine: Negative for polyuria.   Genitourinary:  Negative for dysuria.   Musculoskeletal:  Negative for back pain.   Skin:  Positive for wound.        Right posterior scalp abrasion   Neurological:  Positive for headaches. Negative for dizziness.   Psychiatric/Behavioral:  Negative for agitation and confusion.      12-point, complete review of systems was reviewed and negative except as stated above.     Historical Information     History reviewed. No pertinent past medical history.  History reviewed. No pertinent surgical history.     Social History     Tobacco Use    Smoking status: Never    Smokeless tobacco: Never     Immunization History   Administered Date(s) Administered    COVID-19 Pfizer mRNA vacc PF margy-sucrose 12 yr and older (Comirnaty) 11/15/2023     Last Tetanus: N/a  Family History: Non-contributory    Meds/Allergies   all current active meds have been reviewed   No Known Allergies    Objective   Initial Vitals:   Temperature: (!) 96.9 °F (36.1 °C) (02/14/24 0953)  Pulse: 66 (02/14/24 0953)  Respirations: 16 (02/14/24 0953)  Blood Pressure: 125/81 (02/14/24 0953)    Primary Survey:   Airway:        Status: patent;        Pre-hospital Interventions: none        Hospital Interventions: none  Breathing:        Pre-hospital Interventions: none       Effort: normal       Right breath  "sounds: normal       Left breath sounds: normal  Circulation:        Rhythm: regular       Rate: regular   Right Pulses Left Pulses    R radial: 2+    R pedal: 2+     L radial: 2+    L pedal: 2+       Disability:        GCS: Eye: 4; Verbal: 5 Motor: 6 Total: 15       Right Pupil: round;  reactive         Left Pupil:  round;  reactive      R Motor Strength L Motor Strength    R : 4/5  R dorsiflex: 5/5  R plantarflex: 5/5 L : 4/5  L dorsiflex: 5/5  L plantarflex: 5/5        Sensory:  No sensory deficit  Exposure:       Completed: Yes      Secondary Survey:  Physical Exam  Constitutional:       Appearance: Normal appearance.   HENT:      Head: Normocephalic.      Comments: Right posterior scalp abrasion     Nose: Nose normal.      Mouth/Throat:      Mouth: Mucous membranes are moist.      Pharynx: Oropharynx is clear.   Eyes:      Extraocular Movements: Extraocular movements intact.      Pupils: Pupils are equal, round, and reactive to light.   Cardiovascular:      Rate and Rhythm: Normal rate and regular rhythm.      Pulses: Normal pulses.      Heart sounds: Normal heart sounds.   Pulmonary:      Effort: Pulmonary effort is normal.      Breath sounds: Normal breath sounds.   Abdominal:      General: Abdomen is flat.      Palpations: Abdomen is soft.   Musculoskeletal:         General: Normal range of motion.      Cervical back: Normal range of motion.   Skin:     General: Skin is warm.   Neurological:      Mental Status: She is alert and oriented to person, place, and time.         Invasive Devices       None                 Lab Results: I have personally reviewed all pertinent laboratory/test results 02/14/24 and in the preceding 24 hours.  No results for input(s): \"WBC\", \"HGB\", \"HCT\", \"PLT\", \"BANDSPCT\", \"SODIUM\", \"K\", \"CL\", \"CO2\", \"BUN\", \"CREATININE\", \"GLUC\", \"CAIONIZED\", \"MG\", \"PHOS\", \"AST\", \"ALT\", \"ALB\", \"TBILI\", \"DBILI\", \"ALKPHOS\", \"PTT\", \"INR\", \"HSTNI0\", \"HSTNI2\", \"BNP\", \"LACTICACID\" in the last 72 " hours.    Imaging Results: I have personally reviewed pertinent images saved in PACS. CT scan findings (and other pertinent positive findings on images) were discussed with radiology. My interpretation of the images/reports are as follows:  Chest Xray(s): N/A   FAST exam(s): N/A   CT Scan(s): positive for acute findings: right subarachnoid hemorrhage   Additional Xray(s): N/A     Other Studies: N/a    Code Status: No Order  Advance Directive and Living Will:      Power of :    POLST:    I have spent 30 minutes with Patient and family today in which greater than 50% of this time was spent in counseling/coordination of care regarding Diagnostic results and Prognosis.

## 2024-02-15 ENCOUNTER — APPOINTMENT (INPATIENT)
Dept: RADIOLOGY | Facility: HOSPITAL | Age: 69
DRG: 083 | End: 2024-02-15

## 2024-02-15 ENCOUNTER — TELEPHONE (OUTPATIENT)
Dept: NEUROSURGERY | Facility: CLINIC | Age: 69
End: 2024-02-15

## 2024-02-15 VITALS
RESPIRATION RATE: 18 BRPM | SYSTOLIC BLOOD PRESSURE: 118 MMHG | OXYGEN SATURATION: 97 % | BODY MASS INDEX: 15.74 KG/M2 | HEART RATE: 73 BPM | DIASTOLIC BLOOD PRESSURE: 64 MMHG | WEIGHT: 72.97 LBS | TEMPERATURE: 97.8 F | HEIGHT: 57 IN

## 2024-02-15 LAB
ANION GAP SERPL CALCULATED.3IONS-SCNC: 8 MMOL/L
ATRIAL RATE: 72 BPM
BASOPHILS # BLD AUTO: 0.03 THOUSANDS/ÂΜL (ref 0–0.1)
BASOPHILS NFR BLD AUTO: 0 % (ref 0–1)
BUN SERPL-MCNC: 9 MG/DL (ref 5–25)
CALCIUM SERPL-MCNC: 8.4 MG/DL (ref 8.4–10.2)
CHLORIDE SERPL-SCNC: 105 MMOL/L (ref 96–108)
CO2 SERPL-SCNC: 27 MMOL/L (ref 21–32)
CREAT SERPL-MCNC: 0.54 MG/DL (ref 0.6–1.3)
EOSINOPHIL # BLD AUTO: 0.04 THOUSAND/ÂΜL (ref 0–0.61)
EOSINOPHIL NFR BLD AUTO: 1 % (ref 0–6)
ERYTHROCYTE [DISTWIDTH] IN BLOOD BY AUTOMATED COUNT: 12.7 % (ref 11.6–15.1)
GFR SERPL CREATININE-BSD FRML MDRD: 98 ML/MIN/1.73SQ M
GLUCOSE SERPL-MCNC: 89 MG/DL (ref 65–140)
HCT VFR BLD AUTO: 36.5 % (ref 34.8–46.1)
HGB BLD-MCNC: 11.6 G/DL (ref 11.5–15.4)
IMM GRANULOCYTES # BLD AUTO: 0.02 THOUSAND/UL (ref 0–0.2)
IMM GRANULOCYTES NFR BLD AUTO: 0 % (ref 0–2)
LYMPHOCYTES # BLD AUTO: 1.45 THOUSANDS/ÂΜL (ref 0.6–4.47)
LYMPHOCYTES NFR BLD AUTO: 20 % (ref 14–44)
MCH RBC QN AUTO: 30.1 PG (ref 26.8–34.3)
MCHC RBC AUTO-ENTMCNC: 31.8 G/DL (ref 31.4–37.4)
MCV RBC AUTO: 95 FL (ref 82–98)
MONOCYTES # BLD AUTO: 0.36 THOUSAND/ÂΜL (ref 0.17–1.22)
MONOCYTES NFR BLD AUTO: 5 % (ref 4–12)
NEUTROPHILS # BLD AUTO: 5.48 THOUSANDS/ÂΜL (ref 1.85–7.62)
NEUTS SEG NFR BLD AUTO: 74 % (ref 43–75)
NRBC BLD AUTO-RTO: 0 /100 WBCS
P AXIS: 76 DEGREES
PLATELET # BLD AUTO: 237 THOUSANDS/UL (ref 149–390)
PMV BLD AUTO: 10.1 FL (ref 8.9–12.7)
POTASSIUM SERPL-SCNC: 3.7 MMOL/L (ref 3.5–5.3)
PR INTERVAL: 146 MS
QRS AXIS: 71 DEGREES
QRSD INTERVAL: 76 MS
QT INTERVAL: 436 MS
QTC INTERVAL: 477 MS
RBC # BLD AUTO: 3.85 MILLION/UL (ref 3.81–5.12)
SODIUM SERPL-SCNC: 140 MMOL/L (ref 135–147)
T WAVE AXIS: 49 DEGREES
TSH SERPL DL<=0.05 MIU/L-ACNC: 0.9 UIU/ML (ref 0.45–4.5)
VENTRICULAR RATE: 72 BPM
VIT B12 SERPL-MCNC: 240 PG/ML (ref 180–914)
WBC # BLD AUTO: 7.38 THOUSAND/UL (ref 4.31–10.16)

## 2024-02-15 PROCEDURE — 70450 CT HEAD/BRAIN W/O DYE: CPT

## 2024-02-15 PROCEDURE — 99238 HOSP IP/OBS DSCHRG MGMT 30/<: CPT | Performed by: EMERGENCY MEDICINE

## 2024-02-15 PROCEDURE — 85025 COMPLETE CBC W/AUTO DIFF WBC: CPT

## 2024-02-15 PROCEDURE — 80048 BASIC METABOLIC PNL TOTAL CA: CPT

## 2024-02-15 PROCEDURE — G1004 CDSM NDSC: HCPCS

## 2024-02-15 PROCEDURE — 97166 OT EVAL MOD COMPLEX 45 MIN: CPT

## 2024-02-15 PROCEDURE — NC001 PR NO CHARGE: Performed by: EMERGENCY MEDICINE

## 2024-02-15 PROCEDURE — 93010 ELECTROCARDIOGRAM REPORT: CPT | Performed by: INTERNAL MEDICINE

## 2024-02-15 PROCEDURE — 99232 SBSQ HOSP IP/OBS MODERATE 35: CPT | Performed by: PHYSICIAN ASSISTANT

## 2024-02-15 PROCEDURE — 97163 PT EVAL HIGH COMPLEX 45 MIN: CPT

## 2024-02-15 PROCEDURE — 99232 SBSQ HOSP IP/OBS MODERATE 35: CPT | Performed by: NURSE PRACTITIONER

## 2024-02-15 RX ORDER — LEVETIRACETAM 500 MG/1
500 TABLET ORAL EVERY 12 HOURS SCHEDULED
Qty: 11 TABLET | Refills: 0 | Status: SHIPPED | OUTPATIENT
Start: 2024-02-15 | End: 2024-02-21

## 2024-02-15 RX ORDER — ACETAMINOPHEN 325 MG/1
650 TABLET ORAL EVERY 6 HOURS PRN
Qty: 30 TABLET | Refills: 0 | Status: SHIPPED | OUTPATIENT
Start: 2024-02-15 | End: 2024-02-22

## 2024-02-15 RX ORDER — DOCUSATE SODIUM 100 MG/1
100 CAPSULE, LIQUID FILLED ORAL 2 TIMES DAILY
Status: DISCONTINUED | OUTPATIENT
Start: 2024-02-15 | End: 2024-02-15 | Stop reason: HOSPADM

## 2024-02-15 RX ORDER — SERTRALINE HYDROCHLORIDE 25 MG/1
25 TABLET, FILM COATED ORAL DAILY
Qty: 30 TABLET | Refills: 0 | Status: SHIPPED | OUTPATIENT
Start: 2024-02-16 | End: 2024-04-16

## 2024-02-15 RX ADMIN — DOCUSATE SODIUM 100 MG: 100 CAPSULE, LIQUID FILLED ORAL at 10:20

## 2024-02-15 RX ADMIN — LEVETIRACETAM 500 MG: 500 TABLET, FILM COATED ORAL at 08:45

## 2024-02-15 RX ADMIN — SERTRALINE HYDROCHLORIDE 25 MG: 25 TABLET ORAL at 08:45

## 2024-02-15 NOTE — OCCUPATIONAL THERAPY NOTE
Occupational Therapy Evaluation     Patient Name: Jessica Vincent  Today's Date: 2/15/2024  Problem List  Principal Problem:    SAH (subarachnoid hemorrhage) (HCC)  Active Problems:    Fall    ICH (intracerebral hemorrhage) (HCC)    Dementia (HCC)    Past Medical History  Past Medical History:   Diagnosis Date    Dementia (HCC)     Difficulty in swallowing      Past Surgical History  History reviewed. No pertinent surgical history.      02/15/24 1105   OT Last Visit   OT Visit Date 02/15/24   Note Type   Note type Evaluation   Pain Assessment   Pain Assessment Tool 0-10   Pain Score No Pain   Restrictions/Precautions   Other Precautions Cognitive;Chair Alarm;Bed Alarm;Fall Risk   Home Living   Type of Home House   Prior Function   Level of [place] Needs assistance with ADLs;Independent with functional mobility;Needs assistance with IADLS   Lives With Family   Receives Help From Family   IADLs Family/Friend/Other provides transportation;Family/Friend/Other provides meals;Family/Friend/Other provides medication management   Falls in the last 6 months 1 to 4   Vocational Retired   Lifestyle   Autonomy family provides reminders and assist with adls prn - ambulates Krystyna w/o ad - family manages iadls   Reciprocal Relationships supportive family   Service to Others retired   Intrinsic Gratification active pta - enjoys walking   Subjective   Subjective offers no c/o   ADL   Eating Assistance 5  Supervision/Setup   Grooming Assistance 5  Supervision/Setup   UB Bathing Assistance 5  Supervision/Setup   LB Bathing Assistance 5  Supervision/Setup   UB Dressing Assistance 5  Supervision/Setup   LB Dressing Assistance 5  Supervision/Setup   Toileting Assistance  5  Supervision/Setup   Bed Mobility   Supine to Sit 5  Supervision   Transfers   Sit to Stand 5  Supervision   Stand to Sit 5  Supervision   Functional Mobility   Functional Mobility 5  Supervision   Balance   Static Sitting Good   Dynamic Sitting Fair +   Static Standing  Fair   Dynamic Standing Fair   Ambulatory Fair -   Activity Tolerance   Activity Tolerance Patient tolerated treatment well   RUE Assessment   RUE Assessment WFL   LUE Assessment   LUE Assessment WFL   Cognition   Overall Cognitive Status Impaired   Arousal/Participation Arousable;Cooperative   Attention Attends with cues to redirect   Orientation Level Oriented to person   Memory Unable to assess   Following Commands Follows one step commands with increased time or repetition   Comments family present and able to translate as pt speaks Mandarin   Assessment   Limitation Decreased ADL status;Decreased Safe judgement during ADL;Decreased cognition;Decreased endurance;Decreased self-care trans   Prognosis Good   Assessment Pt is a 68 y.o. female who was [facility] to [facility] on [facility]/14/2024 with SAH (subarachnoid hemorrhage) (HCC) s/p fall on ice Patient  has a past medical history of Dementia (HCC) and Difficulty in swallowing. At baseline pt was completing adls with cues/support from family PRN - ambulates Krystyna w/o ad - family manages iadls. Pt lives with family in house. Currently pt requires sba for overall ADLS and sba for functional mobility/transfers. Pt currently presents with impairments in the following categories -difficulty performing ADLS, difficulty performing IADLS , limited insight into deficits, and environment activity tolerance, endurance, standing balance/tolerance, memory, insight, safety , judgement , and attention . These impairments, as well as pt's fatigue, risk for falls, and home environment  limit pt's ability to safely engage in all baseline areas of occupation, includingbathing, dressing, toileting, functional mobility/transfers, community mobility, social participation , and leisure activities however has supportive family who are able to assist prn -  From OT standpoint, recommend home with family support upon D/C -Level IV. No immediate acute OT needs indicated as pt is at/close  to baseline - d/c from caseload   Goals   Patient Goals none stated 2* cognitive limitations and language barrier   Plan   OT Frequency Eval only   Discharge Recommendation   Rehab Resource Intensity Level, OT No post-acute rehabilitation needs   AM-PAC Daily Activity Inpatient   Lower Body Dressing 3   Bathing 3   Toileting 3   Upper Body Dressing 4   Grooming 4   Eating 4   Daily Activity Raw Score 21   Daily Activity Standardized Score (Calc for Raw Score >=11) 44.27   AM-PAC Applied Cognition Inpatient   Following a Speech/Presentation 2   Understanding Ordinary Conversation 3   Taking Medications 2   Remembering Where Things Are Placed or Put Away 2   Remembering List of 4-5 Errands 1   Taking Care of Complicated Tasks 1   Applied Cognition Raw Score 11   Applied Cognition Standardized Score 27.03   End of Consult   Education Provided Yes;Family or social support of family present for education by provider   Patient Position at End of Consult Bedside chair;Bed/Chair alarm activated;All needs within reach   Nurse Communication Nurse aware of consult       The patient's raw score on the AM-PAC Daily Activity Inpatient Short Form is 21. A raw score of greater than or equal to 19 suggests the patient may benefit from discharge to post-acute rehabilitation services. Please refer to the recommendation of the Occupational Therapist for safe discharge planning.      Isabella Lockwood

## 2024-02-15 NOTE — PLAN OF CARE
Problem: PAIN - ADULT  Goal: Verbalizes/displays adequate comfort level or baseline comfort level  Description: Interventions:  - Encourage patient to monitor pain and request assistance  - Assess pain using appropriate pain scale  - Administer analgesics based on type and severity of pain and evaluate response  - Implement non-pharmacological measures as appropriate and evaluate response  - Consider cultural and social influences on pain and pain management  - Notify physician/advanced practitioner if interventions unsuccessful or patient reports new pain  Outcome: Progressing     Problem: INFECTION - ADULT  Goal: Absence or prevention of progression during hospitalization  Description: INTERVENTIONS:  - Assess and monitor for signs and symptoms of infection  - Monitor lab/diagnostic results  - Monitor all insertion sites, i.e. indwelling lines, tubes, and drains  - Monitor endotracheal if appropriate and nasal secretions for changes in amount and color  - [place] appropriate cooling/warming therapies per order  - Administer medications as ordered  - Instruct and encourage patient and family to use good hand hygiene technique  - Identify and instruct in appropriate isolation precautions for identified infection/condition  Outcome: Progressing  Goal: Absence of fever/infection during neutropenic period  Description: INTERVENTIONS:  - Monitor WBC    Outcome: Progressing     Problem: SAFETY ADULT  Goal: Patient will remain free of falls  Description: INTERVENTIONS:  - Educate patient/family on patient safety including physical limitations  - Instruct patient to call for assistance with activity   - Consult OT/PT to assist with strengthening/mobility   - Keep Call bell within reach  - Keep bed low and locked with side rails adjusted as appropriate  - Keep care items and personal belongings within reach  - Initiate and maintain comfort rounds  - Make Fall Risk Sign visible to staff  - Offer Toileting every 2 Hours,  in advance of need  - Initiate/Maintain alarm  - Obtain necessary fall risk management equipment:   - Apply yellow socks and bracelet for high fall risk patients  - Consider moving patient to room near nurses station  Outcome: Progressing  Goal: Maintain or return to baseline ADL function  Description: INTERVENTIONS:  -  Assess patient's ability to carry out ADLs; assess patient's baseline for ADL function and identify physical deficits which impact ability to perform ADLs (bathing, care of mouth/teeth, toileting, grooming, dressing, etc.)  - Assess/evaluate cause of self-care deficits   - Assess range of motion  - Assess patient's mobility; develop plan if impaired  - Assess patient's need for assistive devices and provide as appropriate  - Encourage maximum independence but intervene and supervise when necessary  - Involve family in performance of ADLs  - Assess for home care needs following discharge   - Consider OT consult to assist with ADL evaluation and planning for discharge  - Provide patient education as appropriate  Outcome: Progressing  Goal: Maintains/Returns to pre admission functional level  Description: INTERVENTIONS:  - Perform AM-PAC 6 Click Basic Mobility/ Daily Activity assessment daily.  - Set and communicate daily mobility goal to care team and patient/family/caregiver.   - Collaborate with rehabilitation services on mobility goals if consulted  - Perform Range of Motion  times a day.  - Reposition patient every 2 hours.  - Dangle patient 3 times a day  - Stand patient 3 times a day  - Ambulate patient 3 times a day  - Out of bed to chair 3 times a day   - Out of bed for meals 3 times a day  - Out of bed for toileting  - Record patient progress and toleration of activity level   Outcome: Progressing     Problem: DISCHARGE PLANNING  Goal: Discharge to home or other facility with appropriate resources  Description: INTERVENTIONS:  - Identify barriers to discharge w/patient and caregiver  -  Arrange for needed discharge resources and transportation as appropriate  - Identify discharge learning needs (meds, wound care, etc.)  - Arrange for interpretive services to assist at discharge as needed  - Refer to Case Management Department for coordinating discharge planning if the patient needs post-hospital services based on physician/advanced practitioner order or complex needs related to functional status, cognitive ability, or social support system  Outcome: Progressing     Problem: Knowledge Deficit  Goal: Patient/family/caregiver demonstrates understanding of disease process, treatment plan, medications, and discharge instructions  Description: Complete learning assessment and assess knowledge base.  Interventions:  - Provide teaching at level of understanding  - Provide teaching via preferred learning methods  Outcome: Progressing

## 2024-02-15 NOTE — DISCHARGE INSTR - AVS FIRST PAGE
Neurosurgery discharge instructions following traumatic head bleed:     Do not take any blood thinning medications (ie. No Advil. No motrin. No ibuprofen. No Aleve. No Aspirin. No fishoil. No heparin. No antiplatelet / no anticoagulation medication).  Refrain from activity that increases chance of trauma to head or falls. Recommend you take fall precaution.  No strenuous activity or sports.  Return to hospital Emergency Room if you experience worsening / new headache, nausea/vomiting, speech/vision change, seizure, confusion / mental status change, weakness, or other neurological changes.      Follow-up as scheduled with a repeat CT head without contrast to be completed 2-3 days prior to visit.  Prescription has been entered electronically.  Please call [phone] to schedule.

## 2024-02-15 NOTE — UTILIZATION REVIEW
Initial Clinical Review    Admission: Date/Time/Statement:   Admission Orders (From admission, onward)       Ordered        02/14/24 1307  Inpatient Admission  Once                          Orders Placed This Encounter   Procedures    Inpatient Admission     Standing Status:   Standing     Number of Occurrences:   1     Order Specific Question:   Level of Care     Answer:   Level 2 Stepdown / HOT [14]     Order Specific Question:   Estimated length of stay     Answer:   More than 2 Midnights     Order Specific Question:   Certification     Answer:   I certify that inpatient services are medically necessary for this patient for a duration of greater than two midnights. See H&P and MD Progress Notes for additional information about the patient's course of treatment.     ED Arrival Information       Expected   -    Arrival   2/14/2024 09:49    Acuity   Urgent              Means of arrival   Walk-In    Escorted by   Family Member    Service   Trauma    Admission type   Emergency              Arrival complaint   fall;head injury             Chief Complaint   Patient presents with    Fall     Fall on ice this morning. +HS - thinners. Brief LOC. +N/V Lac on R side of head. Bleeding controlled with gauze       Initial Presentation: 68 y.o. female to ED presents for Fall with headache and nausea. Per daughter, pt was walking on the sidewalk and slipped and fell on ice. She fell backwards and had head strike along with loss of consciousness. Immediately after the fall the patient did feel dizzy. Has cognitive deficits at baseline secondary to dementia. Ambulates independently. She does have difficulty with swallowing solids and liquids orally. Sometimes she does regurgitate food. No significant PMH.  Admit Inpatient level of care for Fall with Right frontotemporal subarachnoid hemorrhage. High Observation Trauma. Start Keppra bid. Neurosurgery consult. Neuro checks q1h. Speech consult.     2/14  Neurosurgery cons; SAH  (subarachnoid hemorrhage). Frequent neuro checks. Recommend repeat CT head tomorrow for stability. Keppra for seizure ppx. No AC/AP. Ongoing medical management.  CT head 2/14/2024: Acute right frontotemporal subarachnoid hemorrhage      ED Triage Vitals   Temperature Pulse Respirations Blood Pressure SpO2   02/14/24 0953 02/14/24 0953 02/14/24 0953 02/14/24 0953 02/14/24 0953   (!) 96.9 °F (36.1 °C) 66 16 125/81 100 %      Temp Source Heart Rate Source Patient Position - Orthostatic VS BP Location FiO2 (%)   02/14/24 0953 02/14/24 1330 02/14/24 0953 -- --   Temporal Monitor Lying        Pain Score       02/14/24 1053       6          Wt Readings from Last 1 Encounters:   02/14/24 33.1 kg (72 lb 15.6 oz)     Additional Vital Signs:   2/15/24 0700 97.8 °F (36.6 °C) -- 18 120/60 -- -- -- --   02/15/24 02:37:50 97.8 °F (36.6 °C) 66 16 118/65 83 97 % -- --   02/14/24 22:12:42 98 °F (36.7 °C) 72 16 101/58 72 98 % -- --   02/14/24 19:05:29 98 °F (36.7 °C) 66 -- 99/59 72 98 % -- --   02/14/24 14:45:59 97.7 °F (36.5 °C) 71 18 155/82 106 97 % --      Pertinent Labs/Diagnostic Test Results:   CT head wo contrast   Final Result by Fernando Monique MD (02/15 0817)      Improving subarachnoid hemorrhage within the right sylvian fissure. No new hemorrhage.                  Workstation performed: [id number]         CTA head and neck w wo contrast   Final Result by Martell Grover MD (02/14 1118)      Similar acute small volume slightly thickened subarachnoid hemorrhage throughout entire right sylvian fissure surrounding right MCA M1, M2, and posterior M3 segments. No vasospasm identified.      Negative CTA head and neck for aneurysm, arteriovenous vascular malformation, large vessel occlusion, dissection, or high-grade stenosis.      Asymmetric contrast opacification of right cavernous sinus, likely due to arterial bolus timing. Difficult to exclude right carotid cavernous fistula. Recommend consultation with the Neurovascular  [place], a division of [facility]    at [phone].      Mild atherosclerotic disease of the head and neck, as detailed above.      The study was marked in EPIC for immediate notification.                        Workstation performed: [id number]         CT head without contrast   Final Result by Joe Lester MD (02/14 1238)      Acute right frontotemporal subarachnoid hemorrhage.      No skull fracture.      Mild chronic microangiopathy.         I personally discussed this study with TONE HAMILTON on 2/14/2024 at 12:38 PM.            Workstation performed: [id number]         CT spine cervical without contrast   Final Result by Joe Lester MD (02/14 1232)      No cervical spine fracture or traumatic malalignment.                  Workstation performed: [id number]         CT head wo contrast    (Results Pending)         Results from last 7 days   Lab Units 02/15/24  0632 02/14/24  1327   WBC Thousand/uL 7.38 18.45*   HEMOGLOBIN g/dL 11.6 13.4   HEMATOCRIT % 36.5 42.6   PLATELETS Thousands/uL 237 257   NEUTROS ABS Thousands/µL 5.48 17.03*         Results from last 7 days   Lab Units 02/15/24  0632 02/14/24  1327   SODIUM mmol/L 140 139   POTASSIUM mmol/L 3.7 3.9   CHLORIDE mmol/L 105 103   CO2 mmol/L 27 28   ANION GAP mmol/L 8 8   BUN mg/dL 9 13   CREATININE mg/dL 0.54* 0.49*   EGFR ml/min/1.73sq m 98 101   CALCIUM mg/dL 8.4 9.5   CALCIUM, IONIZED mmol/L  --  1.07*   MAGNESIUM mg/dL  --  2.2   PHOSPHORUS mg/dL  --  3.4     Results from last 7 days   Lab Units 02/14/24  1327   AST U/L 26   ALT U/L 29   ALK PHOS U/L 56   TOTAL PROTEIN g/dL 7.7   ALBUMIN g/dL 4.4   TOTAL BILIRUBIN mg/dL 0.57         Results from last 7 days   Lab Units 02/15/24  0632 02/14/24  1327   GLUCOSE RANDOM mg/dL 89 130       Results from last 7 days   Lab Units 02/14/24  1327   TSH 3RD GENERATON uIU/mL 0.903         ED Treatment:   Medication Administration from 02/14/2024 0949 to 02/14/2024  1436         Date/Time Order Dose Route Action     02/14/2024 1053 EST acetaminophen (TYLENOL) tablet 975 mg 975 mg Oral Given     02/14/2024 1053 EST ondansetron (ZOFRAN-ODT) dispersible tablet 4 mg 4 mg Oral Given     02/14/2024 1405 EST levETIRAcetam (KEPPRA) tablet 500 mg 500 mg Oral Given     02/14/2024 1430 EST iohexol (OMNIPAQUE) 350 MG/ML injection (MULTI-DOSE) 100 mL 100 mL Intravenous Given          Past Medical History:   Diagnosis Date    Dementia (HCC)     Difficulty in swallowing      Present on Admission:  **None**      Admitting Diagnosis: Contusion [T14.8XXA]  Head injury [S09.90XA]  Unspecified multiple injuries, initial encounter [T07.XXXA]  Subarachnoid hematoma (HCC) [S06.6XAA]  Age/Sex: 68 y.o. female    Admission Orders:  Scheduled Medications:  docusate sodium, 100 mg, Oral, BID  levETIRAcetam, 500 mg, Oral, Q12H TOY  sertraline, 25 mg, Oral, Daily      Continuous IV Infusions: None     PRN Meds:  acetaminophen, 650 mg, Oral, Q6H PRN  HYDROmorphone, 0.2 mg, Intravenous, Q3H PRN  ondansetron, 4 mg, Intravenous, Q4H PRN  oxyCODONE, 5 mg, Oral, Q4H PRN  oxyCODONE, 2.5 mg, Oral, Q4H PRN      Neuro checks q1h  IP CONSULT TO NEUROSURGERY  IP CONSULT TO GERONTOLOGY    Network Utilization Review Department  ATTENTION: Please call with any questions or concerns to [phone] and carefully listen to the prompts so that you are directed to the right person. All voicemails are confidential.   For Discharge needs, contact Care Management DC Support Team at [phone] opt. 2  Send all requests for admission clinical reviews, approved or denied determinations and any other requests to dedicated fax number below belonging to the campus where the patient is receiving treatment. List of dedicated fax numbers for the Facilities:  FACILITY NAME UR FAX NUMBER   ADMISSION DENIALS (Administrative/Medical Necessity) [phone]   DISCHARGE SUPPORT TEAM (NETWORK) [phone]   [facility]  (Maternity/NICU/Pediatrics) [phone]   [facility] [phone]   [facility] [phone]   [facility] [phone]   [facility] [phone]   [facility] [phone]   [facility] [phone]   [facility] [phone]   [facility] [phone]   [facility] [phone]   [facility] [phone]   [facility] [phone]   [facility] [phone]

## 2024-02-15 NOTE — TELEPHONE ENCOUNTER
2/15/24 - PT IN [facility]  3/1/24 2 WK HFU W/CTH     PER MILAD, 2 WK HFU W/CTH W/AP

## 2024-02-15 NOTE — PROGRESS NOTES
"[facility]  Progress Note  Name: Jessica Vincent I  MRN: [id number]  Unit/Bed#: [facility] 634-01 I Date of Admission: 2024   Date of Service: 2/15/2024 I Hospital Day: 1    Assessment/Plan   SAH (subarachnoid hemorrhage) (HCC)  Assessment & Plan  As Above    Dementia (HCC)  Assessment & Plan  Geriatrics consulted.     ICH (intracerebral hemorrhage) ([facility])  Assessment & Plan  Secondary to fall  CT head: Acute right frontotemporal subarachnoid hemorrhage.  CTA Head Neck: No aneurysm   Admitted HOT, Continue neuro checks  Reversal agent administered: Patient does not take anti-platelet or anti-coagulant medications. No reversal agent indicated.   F/u CT head: 2/15/2024 Shows stability  Appreciate neurosurgery recommendations  Complete 7 day course of Keppra for seizure prophylaxis  DVT Prophylaxis started  Hold all anticoagulants and anti platelet medications until cleared by neurosurgery to resume  PT/OT and cognitive evaluation    Fall  Assessment & Plan  Mechanical fall  Injuries listed below  PT/OT             Bowel Regimen: Colace   VTE Prophylaxis:Enoxaparin (Lovenox)     Disposition: Pending PT OT evaluation, likely home with family, possibly today vs tomorrow, await PT/OT Geriatric consults.    Subjective   Chief Complaint: None    Subjective: Patient stated to daughter who interpreted \"I feel good would like to go home now\". Daughter states patient has dementia, developed dementia after  , lives with daughter and family. Typically patient walks well without assistive device.      Objective   Vitals:   Temp:  [96.9 °F (36.1 °C)-98 °F (36.7 °C)] 97.8 °F (36.6 °C)  HR:  [66-77] 66  Resp:  [16-18] 18  BP: ()/(58-82) 120/60    I/O          0701   0700  0701  02/15 0700 02/15 0701   0700           Unmeasured Urine Occurrence  1 x     Unmeasured Stool Occurrence  0 x              Physical Exam:   GENERAL APPEARANCE: NAD appears comfortable lying in " bed  NEURO: GCS 14, pleasantly confused, Alert and oriented x 1  HEENT: PERRL  CV: RRR  LUNGS: CTA B/L throughout  GI: Abdomen soft, NT, ND, +BS x 4  : Voiding  MSK: CLEANING's  SKIN: Intact    Invasive Devices       Peripheral Intravenous Line  Duration             Peripheral IV 02/14/24 Proximal;Right;Ventral (anterior) Forearm <1 day                          Lab Results: Results: I have personally reviewed all pertinent laboratory/tests results, BMP/CMP:   Lab Results   Component Value Date    SODIUM 140 02/15/2024    K 3.7 02/15/2024     02/15/2024    CO2 27 02/15/2024    BUN 9 02/15/2024    CREATININE 0.54 (L) 02/15/2024    CALCIUM 8.4 02/15/2024    AST 26 02/14/2024    ALT 29 02/14/2024    ALKPHOS 56 02/14/2024    EGFR 98 02/15/2024   , and CBC:   Lab Results   Component Value Date    WBC 7.38 02/15/2024    HGB 11.6 02/15/2024    HCT 36.5 02/15/2024    MCV 95 02/15/2024     02/15/2024    RBC 3.85 02/15/2024    MCH 30.1 02/15/2024    MCHC 31.8 02/15/2024    RDW 12.7 02/15/2024    MPV 10.1 02/15/2024    NRBC 0 02/15/2024     Imaging: I have personally reviewed pertinent reports.     Other Studies: none

## 2024-02-15 NOTE — PROGRESS NOTES
"Progress Note - Jessica Vincent 68 y.o. female MRN: [id number]    Unit/Bed#: [facility] 634-01 Encounter: [id number]      Assessment/Plan:  Dementia  No prior work up, per family \"pt has dementia\"   Mother had dementia was diagnosed in her 80s.  She has repetition, wandering, needs cuing for bathing  Family reports memory issues started after the passing of patients   Cannot exclude depression as contributing cause of memory issues  CT head (2/14/24): chronic microangiopathy  TSH 0.903 (2/14/24)    Recommend check TSH and Vitamin B 12  Continue zoloft 25 mg po daily  Additionally recommend formal comprehensive work up as outpatient     Depression  Started after loss of    Can be cause of pseduodementia  Recommend start of zoloft 25 mg po QHS     Ambulatory dysfunction  At risk for falls secondary to age, hx of fall, gait instability, polypharmacy, visual impairment  Fall precautions  PT/OT  Increased physical exercise, recommend balance and strengthening exercises  Rehab post hospitalization     Acute pain due to trauma  Geriatric pain protocol  Scheduled acetaminophen 975 mg po Q8  Oxycodone 2.5 mg po Q 4 prn moderate pain  Oxycodone 5 mg po Q 4 prn severe pain   Monitor for constipation  Lidoderm patch      Right frontal temporal subarachnoid hemorrhage  status post fall, slipped on ice  Neurosurgery on consult  Keppra for seizure ppx     Difficulty swallowing  Per daughter patient has had difficulty swallowing x 2 months  Speech on consult     Frailty  Clinical Frail Scale: 5- Mildly Frail  Lives with supportive family  Albumin 4.4, maintain protein in diet  PT/OT  Continue supportive care     Subjective:   Upon exam pt is sitting in recliner chair. Daughters at bedside. Discussed strategies for wandering. Pt was an avid walker her entire life.  Family locks house doors, has a family member present 24/7. Reports they have an apple air tag in her sneaker. Recommend taking patient on supervised " "walks.    Objective:     Vitals: Blood pressure 118/64, pulse 73, temperature 97.8 °F (36.6 °C), resp. rate 18, height 4' 9.09\" (1.45 m), weight 33.1 kg (72 lb 15.6 oz), SpO2 97%.,Body mass index is 15.74 kg/m².      Intake/Output Summary (Last 24 hours) at 2/15/2024 1116  Last data filed at 2/15/2024 0900  Gross per 24 hour   Intake 240 ml   Output --   Net 240 ml       Physical Exam:   General : NAD  HEENT : MMM   Heart : Normal rate, no murmur rub or gallop  Lungs : CTA no wheezes, rales or rhonchi  Abdomen : Soft, NT/ND, BS auscultated in all 4 quads.   Ext :  no edema  Skin : Pink, warm, dry, age appropriate turgor and mobility  Neuro : Nonfocal  Psych : Alert and O x 3     Invasive Devices       Peripheral Intravenous Line  Duration             Peripheral IV 02/14/24 Proximal;Right;Ventral (anterior) Forearm <1 day                    Lab, Imaging and other studies: I have personally reviewed pertinent films in PACS  VTE Pharmacologic Prophylaxis: Sequential compression device (Venodyne)   VTE Mechanical Prophylaxis: sequential compression device   "

## 2024-02-15 NOTE — PROGRESS NOTES
"[facility]  Progress Note  Name: Jessica Vincent I  MRN: [id number]  Unit/Bed#: [facility] 634-01 I Date of Admission: 2/14/2024   Date of Service: 2/15/2024 I Hospital Day: 1    Assessment/Plan   SAH (subarachnoid hemorrhage) ([facility])  Assessment & Plan  Patient presented s/p slip and fall on ice backwards  No AC/AP   +Head strike without LOC    Imaging:   CT head 2/15/2024: improving subarachnoid hemorrhage in the R sylvian fissure. No new hemorrhage     Plan:   ongoing frequent neurological checks.   Recommend STAT CT head for decline in GCS >2 points in 1 hour.   Recommend follow up in about 2 weeks for continued stability.  Keppra for seizure ppx per trauma team   DVT ppx: SCD's only. Cleared for DVT   No AC/AP reported by family   PT/OT evaluation.   Ongoing medical management and pain control per primary team   CM following for dispo planning  Neurosurgery will sign off at this time. Call with questions or concerns.            Subjective/Objective   Chief Complaint: None     Subjective: Patient has no acute complaints or concerns. At neurological baseline per family. No headaches. Walking in the hallways with family support.     Objective: standing in room     I/O         02/13 0701  02/14 0700 02/14 0701  02/15 0700 02/15 0701  02/16 0700    P.O.   240    Total Intake(mL/kg)   240 (7.3)    Net   +240           Unmeasured Urine Occurrence  2 x 1 x    Unmeasured Stool Occurrence  0 x             Invasive Devices       Peripheral Intravenous Line  Duration             Peripheral IV 02/14/24 Proximal;Right;Ventral (anterior) Forearm <1 day                    Physical Exam:  Vitals: Blood pressure 118/64, pulse 73, temperature 97.8 °F (36.6 °C), resp. rate 18, height 4' 9.09\" (1.45 m), weight 33.1 kg (72 lb 15.6 oz), SpO2 97%.,Body mass index is 15.74 kg/m².    General appearance: alert, appears stated age, cooperative and no distress  Head: Normocephalic, without obvious abnormality  Eyes: " Hospital Medicine Discharge Summary    Patient: Ignacio Sherwood     Gender: female  : 1949   Age: 72 y.o. MRN: [id number]    Admitting Physician: Jam Duran MD  Discharge Physician: Jam Duran MD     Code Status: Full Code     Admit Date: 2023   Discharge Date:   2023    Disposition:  Home  Time spent arranging discharge: 31 minutes    Discharge Diagnoses: Active Hospital Problems    Diagnosis Date Noted    Acute cholecystitis [K81.0] 2023     Priority: Medium   TAMMIE    Condition at Discharge:  Stable    Hospital Course:   Acute cholecystitis s/p lap maurilio developed tammie start ivf this resolved cleared for dc by surgery  Lipase elevated trended down  Patient admitted to hospital with tobacco abuse counseled need to quit greater than 10 minuted spent during course of admission on tobacco cessation    Discharge Exam:    BP (!) 151/68   Pulse 58   Temp 98.1 °F (36.7 °C) (Oral)   Resp 18   Ht 5' 4\" (1.626 m)   Wt 195 lb (88.5 kg)   SpO2 98%   BMI 33.47 kg/m²   General appearance:  Appears comfortable. AAOx3  HEENT: atraumatic, Pupils equal, muscous membranes moist, no masses appreciated  Cardiovascular: Regular rate and rhythm no murmurs appreciated  Respiratory: CTAB no wheezing  Gastrointestinal: Abdomen soft, non-tender, BS+  EXT: no edema  Neurology: no gross focal deficts  Psychiatry: Appropriate affect. Not agitated  Skin: Warm, dry, no rashes appreciated    Discharge Medications:   Current Discharge Medication List        START taking these medications    Details   HYDROcodone-acetaminophen (NORCO) 5-325 MG per tablet Take 1 tablet by mouth every 6 hours as needed for Pain for up to 7 days. Take lowest dose possible to manage pain; may stop taking sooner than 7 days if pain is able to be controlled with non-narcotic analgesics and therapies.  Max Daily Amount: 4 tablets  Qty: 10 tablet, Refills: 0    Comments: Reduce doses taken as pain becomes "EOMI, PERRL  Neck: supple, symmetrical, trachea midline  Back: no kyphosis present  Lungs: non labored breathing  Heart: regular heart rate  Neurologic:   Mental status: Alert, oriented to name only at baseline.   Cranial nerves: grossly intact (Cranial nerves II-XII)  Sensory: normal to light touch   Motor: moving all extremities without focal weakness    Lab Results:  Results from last 7 days   Lab Units 02/15/24  0632 02/14/24  1327   WBC Thousand/uL 7.38 18.45*   HEMOGLOBIN g/dL 11.6 13.4   HEMATOCRIT % 36.5 42.6   PLATELETS Thousands/uL 237 257   NEUTROS PCT % 74 93*   MONOS PCT % 5 2*   EOS PCT % 1 0     Results from last 7 days   Lab Units 02/15/24  0632 02/14/24  1327   SODIUM mmol/L 140 139   POTASSIUM mmol/L 3.7 3.9   CHLORIDE mmol/L 105 103   CO2 mmol/L 27 28   BUN mg/dL 9 13   CREATININE mg/dL 0.54* 0.49*   CALCIUM mg/dL 8.4 9.5   ALK PHOS U/L  --  56   ALT U/L  --  29   AST U/L  --  26     Results from last 7 days   Lab Units 02/14/24  1327   MAGNESIUM mg/dL 2.2     Results from last 7 days   Lab Units 02/14/24  1327   PHOSPHORUS mg/dL 3.4         No results found for: \"TROPONINT\"  ABG:No results found for: \"PHART\", \"DCB4PGP\", \"PO2ART\", \"UNX7VLB\", \"J4TJVSUQ\", \"BEART\", \"SOURCE\"    Imaging Studies: I have personally reviewed pertinent reports.   and I have personally reviewed pertinent films in PACS  CT head wo contrast    Result Date: 2/15/2024  Impression: Improving subarachnoid hemorrhage within the right sylvian fissure. No new hemorrhage. Workstation performed: [id number]     CTA head and neck w wo contrast    Result Date: 2/14/2024  Impression: Similar acute small volume slightly thickened subarachnoid hemorrhage throughout entire right sylvian fissure surrounding right MCA M1, M2, and posterior M3 segments. No vasospasm identified. Negative CTA head and neck for aneurysm, arteriovenous vascular malformation, large vessel occlusion, dissection, or high-grade stenosis. Asymmetric contrast " manageable  Associated Diagnoses: Acute cholecystitis           Current Discharge Medication List        Current Discharge Medication List        CONTINUE these medications which have NOT CHANGED    Details   losartan (COZAAR) 100 MG tablet Take 100 mg by mouth daily      pantoprazole (PROTONIX) 40 MG tablet TAKE 1 TABLET BY MOUTH EVERY DAY      Omega-3 Fatty Acids (OMEGA-3 FISH OIL) 300 MG CAPS Take 300 mg by mouth daily      atorvastatin (LIPITOR) 40 MG tablet Take 80 mg by mouth at bedtime      hydrALAZINE (APRESOLINE) 25 MG tablet TAKE 1 TABLET (25 MG TOTAL) BY MOUTH 2 TIMES A DAY. FOR HTN      loratadine (CLARITIN) 10 MG tablet Take 10 mg by mouth daily      metoprolol succinate (TOPROL XL) 100 MG extended release tablet Take 100 mg by mouth daily      JANUVIA 50 MG tablet TAKE 1 TABLET BY MOUTH EVERY DAY      hydroCHLOROthiazide (HYDRODIURIL) 25 MG tablet TAKE 1 TABLET BY MOUTH EVERY DAY      aspirin 81 MG EC tablet Take 81 mg by mouth daily. Current Discharge Medication List        STOP taking these medications       lisinopril (PRINIVIL;ZESTRIL) 40 MG tablet Comments:   Reason for Stopping:               Labs:  For convenience and continuity at follow-up the following most recent labs are provided:    Lab Results   Component Value Date/Time    WBC 11.1 02/04/2023 05:47 AM    HGB 9.6 02/04/2023 05:47 AM    HCT 28.9 02/04/2023 05:47 AM    MCV 86.4 02/04/2023 05:47 AM     02/04/2023 05:47 AM     02/04/2023 05:47 AM    K 3.9 02/04/2023 05:47 AM     02/04/2023 05:47 AM    CO2 21 02/04/2023 05:47 AM    BUN 34 02/04/2023 05:47 AM    CREATININE 1.2 02/04/2023 05:47 AM    CALCIUM 8.3 02/04/2023 05:47 AM    ALKPHOS 89 02/04/2023 05:47 AM    ALT 37 02/04/2023 05:47 AM    AST 32 02/04/2023 05:47 AM    BILITOT 0.3 02/04/2023 05:47 AM    LABALBU 3.1 02/04/2023 05:47 AM     No results found for: INR    Radiology:  US GALLBLADDER RUQ    Result Date: 2/1/2023  EXAMINATION: RIGHT UPPER QUADRANT ULTRASOUND WITH DOPPLER EVALUATION, 2/1/2023 12:48 pm COMPARISON: None. HISTORY: ORDERING SYSTEM PROVIDED HISTORY: Abdominal pain, eval for cholelithiasis TECHNOLOGIST PROVIDED HISTORY: Reason for exam:->Abdominal pain, eval for cholelithiasis Reason for Exam: fu ct FINDINGS: LIVER: The liver demonstrates normal echogenicity without evidence of intrahepatic biliary ductal dilatation. BILIARY SYSTEM: Gallstone noted within the gallbladder with mild gallbladder wall thickening. No pericholecystic fluid. Chicho Patella Positive sonographic Padilla sign. Common bile duct is within normal limits measuring 0.5 cm. RIGHT KIDNEY: The right kidney is grossly unremarkable without evidence of hydronephrosis. PANCREAS: Visualized portions of the pancreas are unremarkable. OTHER: No evidence of right upper quadrant ascites. Cholelithiasis with mild gallbladder wall thickening and positive sonographic Padilla sign. Findings are suggestive of acute cholecystitis. Recommend HIDA scan for confirmation. XR CHEST PORTABLE    Result Date: 2/1/2023  EXAMINATION: ONE XRAY VIEW OF THE CHEST 2/1/2023 8:48 am COMPARISON: 01/10/2008 HISTORY: ORDERING SYSTEM PROVIDED HISTORY: weakness TECHNOLOGIST PROVIDED HISTORY: Reason for exam:->weakness FINDINGS: The lungs are clear. The cardiac silhouette is enlarged. There is no pneumothorax or pleural effusion. 1. Cardiomegaly. FL CHOLANGIOGRAM OR    Result Date: 2/2/2023  EXAMINATION: SPOT IMAGES FROM AN INTRAOPERATIVE CHOLANGIOGRAM 2/2/2023 8:18 am COMPARISON: None. HISTORY: ORDERING SYSTEM PROVIDED HISTORY: cholangiogram TECHNOLOGIST PROVIDED HISTORY: Reason for exam:->cholangiogram Reason for Exam: la maurilio in OR FLUOROSCOPY DOSE AND TYPE OR TIME AND EXPOSURES: 9 seconds fluoroscopy, 6 fluoroscopic images FINDINGS: Contrast is seen within the cystic duct, common duct, central hepatic ducts, and within small bowel. There is tapering of the distal common bile duct.      Provision of opacification of right cavernous sinus, likely due to arterial bolus timing. Difficult to exclude right carotid cavernous fistula. Recommend [facility] with [facility] of [facility] at [phone]. Mild atherosclerotic disease of the head and neck, as detailed above. The study was marked in EPIC for immediate notification. Workstation performed: [id number]     CT head without contrast    Result Date: 2/14/2024  Impression: Acute right frontotemporal subarachnoid hemorrhage. No skull fracture. Mild chronic microangiopathy. I personally discussed this study with TONE HAMILTON on 2/14/2024 at 12:38 PM. Workstation performed: [id number]     CT spine cervical without contrast    Result Date: 2/14/2024  Impression: No cervical spine fracture or traumatic malalignment. Workstation performed: [id number]       EKG, Pathology, and Other Studies: I have personally reviewed pertinent reports.      VTE Pharmacologic Prophylaxis: Cleared for dvt ppx     VTE Mechanical Prophylaxis: sequential compression device         fluoroscopy for procedural guidance. Please refer to procedure note for further details. CT CHEST ABDOMEN PELVIS W CONTRAST Additional Contrast? None    Result Date: 2/1/2023  EXAMINATION: CT OF THE CHEST, ABDOMEN, AND PELVIS WITH CONTRAST 2/1/2023 9:55 am TECHNIQUE: CT of the chest, abdomen and pelvis was performed with the administration of intravenous contrast. Multiplanar reformatted images are provided for review. Automated exposure control, iterative reconstruction, and/or weight based adjustment of the mA/kV was utilized to reduce the radiation dose to as low as reasonably achievable. COMPARISON: 02/01/2023 HISTORY: ORDERING SYSTEM PROVIDED HISTORY: Chest pain, abdominal pain, nausea TECHNOLOGIST PROVIDED HISTORY: Reason for exam:->Chest pain, abdominal pain, nausea Additional Contrast?->None Decision Support Exception - unselect if not a suspected or confirmed emergency medical condition->Emergency Medical Condition (MA) Reason for Exam: chest pain , abdominal pain , nausea FINDINGS: Chest: Mediastinum: The heart is enlarged. Coronary artery calcifications are a marker of atherosclerosis. Moderate atherosclerosis involves the descending thoracic aorta. There are enlarged subcarinal and left axillary lymph nodes. A subcarinal lymph node measures 1.6 x 2.6 cm. Lungs/pleura: The airway is patent. There is no pneumothorax or pleural effusion. Mild emphysema involves the bilateral upper lungs. There is mild bilateral interstitial thickening due to interstitial edema. 2-4 mm solid pulmonary nodules are scattered throughout the bilateral lungs. Soft Tissues/Bones: Degenerative changes involve the thoracic spine. Abdomen/Pelvis: Organs: The liver, spleen, pancreas, adrenal glands and kidneys are unremarkable. There is bilateral cortical renal scarring. The fat plane surrounding the gallbladder are obscured which can be seen in the setting of cholecystitis. GI/Bowel: There is no bowel obstruction.   The appendix is within normal limits. Scattered diverticula involve the transverse colon without adjacent inflammation. Pelvis: Status post hysterectomy. Peritoneum/Retroperitoneum: There is no free fluid or adenopathy. Moderate to severe atherosclerosis involves the abdominal aorta and bilateral common iliac arteries. Bones/Soft Tissues: Degenerative changes involve the lumbar spine and bilateral hips. 1. Cardiomegaly with mild interstitial edema. 2. Solid subcentimeter bilateral pulmonary nodules. 3. Mediastinal and left axillary adenopathy could be reactive. 4. Mild pericholecystic inflammatory stranding can be seen in the setting of cholecystitis. RECOMMENDATION: Fleischner Society guidelines for follow-up and management of incidentally detected pulmonary nodules: Multiple Solid Nodules: Nodule size less than 6 mm In a low-risk patient, no routine follow-up. In a high-risk patient, optional CT at 12 months.          Signed:    Shelly Thomas MD   2/4/2023

## 2024-02-15 NOTE — ASSESSMENT & PLAN NOTE
Patient presented s/p slip and fall on ice backwards  No AC/AP   +Head strike without LOC    Imaging:   CT head 2/15/2024: improving subarachnoid hemorrhage in the R sylvian fissure. No new hemorrhage     Plan:   ongoing frequent neurological checks.   Recommend STAT CT head for decline in GCS >2 points in 1 hour.   Recommend follow up in about 2 weeks for continued stability.  Keppra for seizure ppx per trauma team   DVT ppx: SCD's only. Cleared for DVT   No AC/AP reported by family   PT/OT evaluation.   Ongoing medical management and pain control per primary team   CM following for dispo planning  Neurosurgery will sign off at this time. Call with questions or concerns.

## 2024-02-15 NOTE — DISCHARGE SUMMARY
Discharge Summary - Jessica Vincent 68 y.o. female MRN: [id number]    [facility] Encounter: [id number]    Admission Date:   Admission Orders (From admission, onward)       Ordered        24 1307  Inpatient Admission  Once                            Admitting Diagnosis: Contusion [T14.8XXA]  Head injury [S09.90XA]  Unspecified multiple injuries, initial encounter [T07.XXXA]  Subarachnoid hematoma (HCC) [S06.6XAA]    HPI: Patient admitted to trauma team after Fall, slip and fall on ice. Sustained SDH.    Procedures Performed:   Orders Placed This Encounter   Procedures    Critical Care       Summary of Hospital Course: Patient admitted HOT protocol, given Keppra for prophylaxis. NSGY consulted. Repeat CTH stable, smaller SDH. Patient continued to progress. Does have dementia, per family patient suffered from dementia after  . Geriatrics consulted. Geriatric added antidepressant. PT/OT consulted cleared patient for home. On the day of discharge the patient is at her baseline GCS. Tolerating a diet. Evaluated by S&S for dysphagia 2 diet with think liquids. Vital signs within normal limits.  She will need to follow up in 2 weeks in outpatient NSGY clinic.    Significant Findings, Care, Treatment and Services Provided: as above    Complications: none    Discharge Diagnosis: SDH    Medical Problems       Resolved Problems  Date Reviewed: 2024   None         Condition at Discharge: good         Discharge instructions/Information to patient and family:   See after visit summary for information provided to patient and family.      Provisions for Follow-Up Care:  See after visit summary for information related to follow-up care and any pertinent home health orders.      PCP: No primary care provider on file.    Disposition: Home    Planned Readmission: No      Discharge Statement   I spent 30 minutes discharging the patient. This time was spent on the day of discharge. I had direct contact with the  patient on the day of discharge. Additional documentation is required if more than 30 minutes were spent on discharge.     Discharge Medications:  See after visit summary for reconciled discharge medications provided to patient and family.

## 2024-02-15 NOTE — PLAN OF CARE
Problem: PAIN - ADULT  Goal: Verbalizes/displays adequate comfort level or baseline comfort level  Description: Interventions:  - Encourage patient to monitor pain and request assistance  - Assess pain using appropriate pain scale  - Administer analgesics based on type and severity of pain and evaluate response  - Implement non-pharmacological measures as appropriate and evaluate response  - Consider cultural and social influences on pain and pain management  - Notify physician/advanced practitioner if interventions unsuccessful or patient reports new pain  2/15/2024 1234 by Justice Kraft RN  Outcome: Adequate for Discharge     Problem: INFECTION - ADULT  Goal: Absence or prevention of progression during hospitalization  Description: INTERVENTIONS:  - Assess and monitor for signs and symptoms of infection  - Monitor lab/diagnostic results  - Monitor all insertion sites, i.e. indwelling lines, tubes, and drains  - Monitor endotracheal if appropriate and nasal secretions for changes in amount and color  - [place] appropriate cooling/warming therapies per order  - Administer medications as ordered  - Instruct and encourage patient and family to use good hand hygiene technique  - Identify and instruct in appropriate isolation precautions for identified infection/condition  2/15/2024 1234 by Justice Kraft RN  Outcome: Adequate for Discharge     Problem: INFECTION - ADULT  Goal: Absence of fever/infection during neutropenic period  Description: INTERVENTIONS:  - Monitor WBC    2/15/2024 1234 by Justice Kraft RN  Outcome: Adequate for Discharge     Problem: SAFETY ADULT  Goal: Patient will remain free of falls  Description: INTERVENTIONS:  - Educate patient/family on patient safety including physical limitations  - Instruct patient to call for assistance with activity   - Consult OT/PT to assist with strengthening/mobility   - Keep Call bell within reach  - Keep bed low and locked with side rails adjusted as  appropriate  - Keep care items and personal belongings within reach  - Initiate and maintain comfort rounds  - Make Fall Risk Sign visible to staff  - Offer Toileting every 1 Hours, in advance of need  - Initiate/Maintain alarm  - Obtain necessary fall risk management equipment  - Apply yellow socks and bracelet for high fall risk patients  - Consider moving patient to room near nurses station  2/15/2024 1234 by Justice Kraft RN  Outcome: Adequate for Discharge     Problem: SAFETY ADULT  Goal: Maintain or return to baseline ADL function  Description: INTERVENTIONS:  -  Assess patient's ability to carry out ADLs; assess patient's baseline for ADL function and identify physical deficits which impact ability to perform ADLs (bathing, care of mouth/teeth, toileting, grooming, dressing, etc.)  - Assess/evaluate cause of self-care deficits   - Assess range of motion  - Assess patient's mobility; develop plan if impaired  - Assess patient's need for assistive devices and provide as appropriate  - Encourage maximum independence but intervene and supervise when necessary  - Involve family in performance of ADLs  - Assess for home care needs following discharge   - Consider OT consult to assist with ADL evaluation and planning for discharge  - Provide patient education as appropriate  2/15/2024 1234 by Justice Kraft RN  Outcome: Adequate for Discharge     Problem: SAFETY ADULT  Goal: Maintains/Returns to pre admission functional level  Description: INTERVENTIONS:  - Perform AM-PAC 6 Click Basic Mobility/ Daily Activity assessment daily.  - Set and communicate daily mobility goal to care team and patient/family/caregiver.   - Collaborate with rehabilitation services on mobility goals if consulted  - Perform Range of Motion 3 times a day.  - Reposition patient every 2 hours.  - Dangle patient 3 times a day  - Stand patient 3 times a day  - Ambulate patient 3 times a day  - Out of bed to chair 3 times a day   - Out of bed for  meals 3 times a day  - Out of bed for toileting  - Record patient progress and toleration of activity level   2/15/2024 1234 by Justice Kraft RN  Outcome: Adequate for Discharge     Problem: DISCHARGE PLANNING  Goal: Discharge to home or other facility with appropriate resources  Description: INTERVENTIONS:  - Identify barriers to discharge w/patient and caregiver  - Arrange for needed discharge resources and transportation as appropriate  - Identify discharge learning needs (meds, wound care, etc.)  - Arrange for interpretive services to assist at discharge as needed  - Refer to Case Management Department for coordinating discharge planning if the patient needs post-hospital services based on physician/advanced practitioner order or complex needs related to functional status, cognitive ability, or social support system  2/15/2024 1234 by Justice Kraft RN  Outcome: Adequate for Discharge     Problem: Knowledge Deficit  Goal: Patient/family/caregiver demonstrates understanding of disease process, treatment plan, medications, and discharge instructions  Description: Complete learning assessment and assess knowledge base.  Interventions:  - Provide teaching at level of understanding  - Provide teaching via preferred learning methods  2/15/2024 1234 by Justice Kraft RN  Outcome: Adequate for Discharge     Problem: NEUROSENSORY - ADULT  Goal: Achieves stable or improved neurological status  Description: INTERVENTIONS  - Monitor and report changes in neurological status  - Monitor vital signs such as temperature, blood pressure, glucose, and any other labs ordered   - Initiate measures to prevent increased intracranial pressure  - Monitor for seizure activity and implement precautions if appropriate      2/15/2024 1234 by Justice Kraft RN  Outcome: Adequate for Discharge     Problem: NEUROSENSORY - ADULT  Goal: Remains free of injury related to seizures activity  Description: INTERVENTIONS  - Maintain airway, patient safety   and administer oxygen as ordered  - Monitor patient for seizure activity, document and report duration and description of seizure to physician/advanced practitioner  - If seizure occurs,  ensure patient safety during seizure  - Reorient patient post seizure  - Seizure pads on all 4 side rails  - Instruct patient/family to notify RN of any seizure activity including if an aura is experienced  - Instruct patient/family to call for assistance with activity based on nursing assessment  - Administer anti-seizure medications if ordered    2/15/2024 1234 by Justice Kraft RN  Outcome: Adequate for Discharge     Problem: NEUROSENSORY - ADULT  Goal: Achieves maximal functionality and self care  Description: INTERVENTIONS  - Monitor swallowing and airway patency with patient fatigue and changes in neurological status  - Encourage and assist patient to increase activity and self care.   - Encourage visually impaired, hearing impaired and aphasic patients to use assistive/communication devices  2/15/2024 1234 by Justice Kraft RN  Outcome: Adequate for Discharge     Problem: DECISION MAKING  Goal: Pt/Family able to effectively weigh alternatives and participate in decision making related to treatment and care  Description: INTERVENTIONS:  - Identify decision maker  - Determine when there are differences among patient's view, family's view, and healthcare provider's view of patient condition and care goals  - Facilitate patient/family articulation of goals for care  - Help patient/family identify pros/cons of alternative solutions  - Provide information as requested by patient/family  - Respect patient/family rights related to privacy and sharing information   - Serve as a liaison between patient, family and health care team  - Initiate consults as appropriate (Ethics Team, Palliative Care, Family Care Conference, etc.)  2/15/2024 1234 by Justice Kraft RN  Outcome: Adequate for Discharge     Problem: CONFUSION/THOUGHT  DISTURBANCE  Goal: Thought disturbances (confusion, delirium, depression, dementia or psychosis) are managed to maintain or return to baseline mental status and functional level  Description: INTERVENTIONS:  - Assess for possible contributors to  thought disturbance, including but not limited to medications, infection, impaired vision or hearing, underlying metabolic abnormalities, dehydration, respiratory compromise,  psychiatric diagnoses and notify attending PHYSICAN/AP  - Monitor and intervene to maintain adequate nutrition, hydration, elimination, sleep and activity  - Decrease environmental stimuli, including noise as appropriate.  - Provide frequent contacts to provide refocusing, direction and reassurance as needed. Approach patient calmly with eye contact and at their level.  - [place] high risk fall precautions, aspiration precautions and other safety measures, as indicated  - If delirium suspected, notify physician/AP of change in condition and request immediate in-person evaluation  - Pursue consults as appropriate including Geriatric (campus dependent), OT for cognitive evaluation/activity planning, psychiatric, pastoral care, etc.  2/15/2024 1234 by Justice Kraft RN  Outcome: Adequate for Discharge

## 2024-02-15 NOTE — PHYSICAL THERAPY NOTE
"   PHYSICAL THERAPY EVALUATION  NAME:  Jessica Vincent  DATE: 02/15/24    AGE:   68 y.o.  Mrn:   [id number]  ADMIT DX:  Contusion [T14.8XXA]  Head injury [S09.90XA]  Unspecified multiple injuries, initial encounter [T07.XXXA]  Subarachnoid hematoma (HCC) [S06.6XAA]    Past Medical History:   Diagnosis Date    Dementia (HCC)     Difficulty in swallowing      History reviewed. No pertinent surgical history.    Length Of Stay: 1  PHYSICAL THERAPY EVALUATION :    02/15/24 1022   Note Type   Note type Evaluation   End of Consult   Patient Position at End of Consult Bedside chair;All needs within reach;Bed/Chair alarm activated   Pain Assessment   Pain Assessment Tool 0-10   Pain Score No Pain   Restrictions/Precautions   Other Precautions Cognitive;Chair Alarm;Bed Alarm;Fall Risk;Impulsive   Home Living   Type of Home House   Home Layout Two level;Able to live on main level with bedroom/bathroom   Prior Function   Level of [place] Needs assistance with ADLs;Independent with functional mobility;Needs assistance with IADLS   Lives With Family   Receives Help From Family   IADLs Family/Friend/Other provides transportation;Family/Friend/Other provides meals;Family/Friend/Other provides medication management   Falls in the last 6 months 1 to 4   Vocational Retired   Cognition   Overall Cognitive Status Impaired   Attention Attends with cues to redirect   Orientation Level Oriented to person   Memory Unable to assess   Following Commands Follows one step commands with increased time or repetition   Comments daughter present and providing all info 2* cogntion and language barrier   Subjective   Subjective \"Im fine\"   RUE Assessment   RUE Assessment WFL   LUE Assessment   LUE Assessment WFL   RLE Assessment   RLE Assessment WFL   LLE Assessment   LLE Assessment WFL   Coordination   Movements are Fluid and Coordinated 1   Sensation WFL   Light Touch   RLE Light Touch Grossly intact   LLE Light Touch Grossly intact   Sharp/Dull " "  RLE Sharp/Dull Grossly intact   LLE Sharp/Dull Grossly intact   Proprioception   RLE Proprioception Grossly intact   LLE Proprioception Grossly Intact   Bed Mobility   Supine to Sit 5  Supervision   Transfers   Sit to Stand 5  Supervision   Stand to Sit 5  Supervision   Toilet transfer 5  Supervision   Additional Comments w/o AD   Ambulation/Elevation   Gait pattern WNL   Gait Assistance 5  Supervision   Assistive Device None   Distance .>400'   Stair Management Assistance 5  Supervision   Stair Management Technique Two rails;Alternating pattern   Balance   Static Sitting Good   Dynamic Sitting Fair +   Static Standing Fair   Dynamic Standing Fair   Ambulatory Fair   Endurance Deficit   Endurance Deficit No   Activity Tolerance   Activity Tolerance Patient tolerated treatment well   Medical Staff Made Aware RN and CM + Tuesday from geriatrics team   Assessment   Prognosis Fair   Problem List Decreased cognition;Impaired judgement;Decreased safety awareness   Assessment Pt is 68 y.o. female seen for PT evaluation s/p admit to [facility] on [facility]/14/2024  s/p slip and fall w/ resulting  SAH (subarachnoid hemorrhage) ([facility]).  Pt  has a past medical history of Dementia ([facility]) and Difficulty in swallowing.   At baseline, pt lives /w daughters in a 2SH w/ FF  Pt ahs hx of dementia and required cues for safety and S 2* same. Daughters report pt having 24/7 S 2* same. Pt is ambulatory w/o AD at baseline and besides this reports 0 other falls. . Currently,  pt  is requiring S A for bed skills; S for functional transfers and S for ambulation w/o AD w/ fast ellen w/o LOB S w/ stairs. Pt repeats \"Im fine\" (in Mandarin w/ family translation) . Daughters report pt at baseline function.    Pt presents functioning at  baseline and currently w/ overall mobility deficits 2* to; cognitive impairment;  decreased safety awareness;  impaired safety and judgement; limited insight into current deficits; bed/ chair alarms; (Please " "find additional objective findings from PT assessment regarding body systems outlined above.) Pt at baseline functional mobility and has support of daughters for d/c. PT recommending d/c to home w/ no skilled PT needs identified at this time.  Pt requires S for safety and for ambulation until time of d/c for safety and cognition.   Goals   Patient Goals none stated- per daughter via translaion to go home\"   Discharge Recommendation   Rehab Resource Intensity Level, PT No post-acute rehabilitation needs   AM-PAC Basic Mobility Inpatient   Turning in Flat Bed Without Bedrails 4   Lying on Back to Sitting on Edge of Flat Bed Without Bedrails 4   Moving Bed to Chair 4   Standing Up From Chair Using Arms 4   Walk in Room 4   Climb 3-5 Stairs With Railing 4   Basic Mobility Inpatient Raw Score 24   Basic Mobility Standardized Score 57.68   Highest Level Of Mobility   JH-HLM Goal 8: Walk 250 feet or more   JH-HLM Achieved 8: Walk 250 feet ot more     The patient's AM-PAC Basic Mobility Inpatient Short Form Raw Score is 24. A Raw score of greater than 16 suggests the patient may benefit from discharge to home. Please also refer to the recommendation of the Physical Therapist for safe discharge planning.               "

## 2024-03-14 DIAGNOSIS — S06.6XAA SUBARACHNOID HEMATOMA (HCC): ICD-10-CM

## 2024-03-22 RX ORDER — SERTRALINE HYDROCHLORIDE 25 MG/1
25 TABLET, FILM COATED ORAL DAILY
Qty: 30 TABLET | Refills: 0 | OUTPATIENT
Start: 2024-03-22 | End: 2024-05-21

## 2024-09-02 ENCOUNTER — HOSPITAL ENCOUNTER (EMERGENCY)
Facility: HOSPITAL | Age: 69
Discharge: HOME | End: 2024-09-02
Attending: EMERGENCY MEDICINE

## 2024-09-02 ENCOUNTER — APPOINTMENT (EMERGENCY)
Dept: RADIOLOGY | Facility: HOSPITAL | Age: 69
End: 2024-09-02

## 2024-09-02 VITALS
HEIGHT: 57 IN | SYSTOLIC BLOOD PRESSURE: 118 MMHG | WEIGHT: 70 LBS | HEART RATE: 82 BPM | DIASTOLIC BLOOD PRESSURE: 91 MMHG | TEMPERATURE: 97.9 F | BODY MASS INDEX: 15.1 KG/M2 | RESPIRATION RATE: 16 BRPM | OXYGEN SATURATION: 96 %
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
## 2024-09-02 DIAGNOSIS — F03.90 DEMENTIA (HCC): ICD-10-CM

## 2024-09-02 DIAGNOSIS — S09.90XA INJURY OF HEAD, INITIAL ENCOUNTER: ICD-10-CM

## 2024-09-02 LAB
ALBUMIN SERPL BCG-MCNC: 3.5 G/DL (ref 3.5–5)
ALP SERPL-CCNC: 44 U/L (ref 34–104)
ALT SERPL W P-5'-P-CCNC: 6 U/L (ref 7–52)
ANION GAP SERPL CALCULATED.3IONS-SCNC: 6 MMOL/L (ref 4–13)
AST SERPL W P-5'-P-CCNC: 10 U/L (ref 13–39)
ATRIAL RATE: 84 BPM
BASOPHILS # BLD AUTO: 0.06 THOUSANDS/ΜL (ref 0–0.1)
BASOPHILS NFR BLD AUTO: 1 % (ref 0–1)
BILIRUB SERPL-MCNC: 0.23 MG/DL (ref 0.2–1)
BUN SERPL-MCNC: 13 MG/DL (ref 5–25)
CALCIUM SERPL-MCNC: 8.7 MG/DL (ref 8.4–10.2)
CARDIAC TROPONIN I PNL SERPL HS: 7 NG/L
CHLORIDE SERPL-SCNC: 99 MMOL/L (ref 96–108)
CO2 SERPL-SCNC: 34 MMOL/L (ref 21–32)
CREAT SERPL-MCNC: 0.31 MG/DL (ref 0.6–1.3)
EOSINOPHIL # BLD AUTO: 0.03 THOUSAND/ΜL (ref 0–0.61)
EOSINOPHIL NFR BLD AUTO: 0 % (ref 0–6)
ERYTHROCYTE [DISTWIDTH] IN BLOOD BY AUTOMATED COUNT: 14.1 % (ref 11.6–15.1)
GFR SERPL CREATININE-BSD FRML MDRD: 117 ML/MIN/1.73SQ M
GLUCOSE SERPL-MCNC: 132 MG/DL (ref 65–140)
HCT VFR BLD AUTO: 37.6 % (ref 34.8–46.1)
HGB BLD-MCNC: 12.1 G/DL (ref 11.5–15.4)
IMM GRANULOCYTES # BLD AUTO: 0.05 THOUSAND/UL (ref 0–0.2)
IMM GRANULOCYTES NFR BLD AUTO: 0 % (ref 0–2)
LYMPHOCYTES # BLD AUTO: 1.42 THOUSANDS/ΜL (ref 0.6–4.47)
LYMPHOCYTES NFR BLD AUTO: 12 % (ref 14–44)
MCH RBC QN AUTO: 30 PG (ref 26.8–34.3)
MCHC RBC AUTO-ENTMCNC: 32.2 G/DL (ref 31.4–37.4)
MCV RBC AUTO: 93 FL (ref 82–98)
MONOCYTES # BLD AUTO: 0.5 THOUSAND/ΜL (ref 0.17–1.22)
MONOCYTES NFR BLD AUTO: 4 % (ref 4–12)
NEUTROPHILS # BLD AUTO: 9.72 THOUSANDS/ΜL (ref 1.85–7.62)
NEUTS SEG NFR BLD AUTO: 83 % (ref 43–75)
NRBC BLD AUTO-RTO: 0 /100 WBCS
P AXIS: 78 DEGREES
PLATELET # BLD AUTO: 515 THOUSANDS/UL (ref 149–390)
PMV BLD AUTO: 8.6 FL (ref 8.9–12.7)
POTASSIUM SERPL-SCNC: 4.1 MMOL/L (ref 3.5–5.3)
PR INTERVAL: 140 MS
PROT SERPL-MCNC: 6.6 G/DL (ref 6.4–8.4)
QRS AXIS: 79 DEGREES
QRSD INTERVAL: 72 MS
QT INTERVAL: 356 MS
QTC INTERVAL: 420 MS
RBC # BLD AUTO: 4.03 MILLION/UL (ref 3.81–5.12)
SODIUM SERPL-SCNC: 139 MMOL/L (ref 135–147)
T WAVE AXIS: 73 DEGREES
VENTRICULAR RATE: 84 BPM
WBC # BLD AUTO: 11.78 THOUSAND/UL (ref 4.31–10.16)

## 2024-09-02 PROCEDURE — 84484 ASSAY OF TROPONIN QUANT: CPT

## 2024-09-02 PROCEDURE — 72125 CT NECK SPINE W/O DYE: CPT

## 2024-09-02 PROCEDURE — 99285 EMERGENCY DEPT VISIT HI MDM: CPT

## 2024-09-02 PROCEDURE — 99285 EMERGENCY DEPT VISIT HI MDM: CPT | Performed by: EMERGENCY MEDICINE

## 2024-09-02 PROCEDURE — 93005 ELECTROCARDIOGRAM TRACING: CPT

## 2024-09-02 PROCEDURE — 80053 COMPREHEN METABOLIC PANEL: CPT

## 2024-09-02 PROCEDURE — 93010 ELECTROCARDIOGRAM REPORT: CPT | Performed by: INTERNAL MEDICINE

## 2024-09-02 PROCEDURE — 85025 COMPLETE CBC W/AUTO DIFF WBC: CPT

## 2024-09-02 PROCEDURE — 36415 COLL VENOUS BLD VENIPUNCTURE: CPT

## 2024-09-02 PROCEDURE — 70450 CT HEAD/BRAIN W/O DYE: CPT

## 2024-09-02 NOTE — ED ATTENDING ATTESTATION
9/2/2024  I, Biju Medina MD, saw and evaluated the patient. I have discussed the patient with the resident/non-physician practitioner and agree with the resident's/non-physician practitioner's findings, Plan of Care, and MDM as documented in the resident's/non-physician practitioner's note, except where noted. All available labs and Radiology studies were reviewed.  I was present for key portions of any procedure(s) performed by the resident/non-physician practitioner and I was immediately available to provide assistance.       At this point I agree with the current assessment done in the Emergency Department.  I have conducted an independent evaluation of this patient a history and physical is as follows:    ED Course     69-year-old female, Chinese national and mandrin Chinese speaking only, essentially nonverbal at baseline, presenting to the emergency department with her daughter who is the primary historian.  Patient has been undergoing process to establish nationality in the [place] over the past year.  During that time, the daughter states that she has had progression of dementia with decreased ability to eat, weight loss, and generalized deconditioning.  Patient has lost approximately 20 pounds over the past year.  Patient was brought to the emergency department today because she had a fall that occurred last night.  This was unwitnessed.  Patient's son-in-law found her awake after hearing her fall.    On examination the patient has a scabbed abrasion/laceration measuring approximately 1.5 x 0.8 cm in the right forehead.  Pupils are 3 mm bilaterally reactive.  The patient tracks.  Mucosal membranes are dry.  Neck is supple.  Lungs are clear bilaterally.  Heart is regular rate and rhythm with no murmurs rubs or gallops.  Abdomen is cachectic, soft and nontender.  Extremities are unremarkable in external appearance.  Full range of motion bilateral shoulders, elbows, wrists, hips, knees, ankles.   Distal pulses intact.  Motor is intact bilateral upper and lower extremities.    MEDICAL DECISION MAKING    Number and Complexity of Problems  Differential diagnosis: Trauma: Intracranial bleed including subarachnoid hemorrhage, epidural hemorrhage, subdural hemorrhage, intraparenchymal bleed, and cervical spine fracture.  Generalized weakness: Anemia, electrolyte dysfunction, acute kidney injury, uremia, generalized deconditioning, acute coronary syndrome.    Medical Decision Making Data  External documents reviewed: Reviewed admission from 2/14/2024 where the patient was admitted after a fall resulting in a subdural hemorrhage.  My EKG interpretation: Normal sinus rhythm.  No acute ST or T wave changes.  My CT interpretation: No acute intracranial bleed.  No cervical spine fracture.      CT head without contrast   ED Interpretation   No acute intracranial bleed.      Final Result      No acute intracranial hemorrhage.      Commensurate ventriculomegaly to the age advanced frontotemporal parenchymal volume loss. Correlate for neurodegenerative disease.      Mild chronic microangiopathic changes.                  Workstation performed: [id number]         CT spine cervical without contrast   ED Interpretation   No fracture.      Final Result      No cervical spine fracture or traumatic malalignment.                  Workstation performed: [id number]             Labs Reviewed   CBC AND DIFFERENTIAL - Abnormal       Result Value Ref Range Status    WBC 11.78 (*) 4.31 - 10.16 Thousand/uL Final    RBC 4.03  3.81 - 5.12 Million/uL Final    Hemoglobin 12.1  11.5 - 15.4 g/dL Final    Hematocrit 37.6  34.8 - 46.1 % Final    MCV 93  82 - 98 fL Final    MCH 30.0  26.8 - 34.3 pg Final    MCHC 32.2  31.4 - 37.4 g/dL Final    RDW 14.1  11.6 - 15.1 % Final    MPV 8.6 (*) 8.9 - 12.7 fL Final    Platelets 515 (*) 149 - 390 Thousands/uL Final    nRBC 0  /100 WBCs Final    Segmented % 83 (*) 43 - 75 % Final    Immature Grans % 0  0 - 2  % Final    Lymphocytes % 12 (*) 14 - 44 % Final    Monocytes % 4  4 - 12 % Final    Eosinophils Relative 0  0 - 6 % Final    Basophils Relative 1  0 - 1 % Final    Absolute Neutrophils 9.72 (*) 1.85 - 7.62 Thousands/µL Final    Absolute Immature Grans 0.05  0.00 - 0.20 Thousand/uL Final    Absolute Lymphocytes 1.42  0.60 - 4.47 Thousands/µL Final    Absolute Monocytes 0.50  0.17 - 1.22 Thousand/µL Final    Eosinophils Absolute 0.03  0.00 - 0.61 Thousand/µL Final    Basophils Absolute 0.06  0.00 - 0.10 Thousands/µL Final   COMPREHENSIVE METABOLIC PANEL - Abnormal    Sodium 139  135 - 147 mmol/L Final    Potassium 4.1  3.5 - 5.3 mmol/L Final    Chloride 99  96 - 108 mmol/L Final    CO2 34 (*) 21 - 32 mmol/L Final    ANION GAP 6  4 - 13 mmol/L Final    BUN 13  5 - 25 mg/dL Final    Creatinine 0.31 (*) 0.60 - 1.30 mg/dL Final    Comment: Standardized to IDMS reference method    Glucose 132  65 - 140 mg/dL Final    Comment: If the patient is fasting, the ADA then defines impaired fasting glucose as > 100 mg/dL and diabetes as > or equal to 123 mg/dL.    Calcium 8.7  8.4 - 10.2 mg/dL Final    AST 10 (*) 13 - 39 U/L Final    ALT 6 (*) 7 - 52 U/L Final    Comment: Specimen collection should occur prior to Sulfasalazine administration due to the potential for falsely depressed results.     Alkaline Phosphatase 44  34 - 104 U/L Final    Total Protein 6.6  6.4 - 8.4 g/dL Final    Albumin 3.5  3.5 - 5.0 g/dL Final    Total Bilirubin 0.23  0.20 - 1.00 mg/dL Final    Comment: Use of this assay is not recommended for patients undergoing treatment with eltrombopag due to the potential for falsely elevated results.  N-acetyl-p-benzoquinone imine (metabolite of Acetaminophen) will generate erroneously low results in samples for patients that have taken an overdose of Acetaminophen.    eGFR 117  ml/min/1.73sq m Final    Narrative:     National Kidney Disease Foundation guidelines for Chronic Kidney Disease (CKD):     Stage 1 with  "normal or high GFR (GFR > 90 mL/min/1.73 square meters)    Stage 2 Mild CKD (GFR = 60-89 mL/min/1.73 square meters)    Stage 3A Moderate CKD (GFR = 45-59 mL/min/1.73 square meters)    Stage 3B Moderate CKD (GFR = 30-44 mL/min/1.73 square meters)    Stage 4 Severe CKD (GFR = 15-29 mL/min/1.73 square meters)    Stage 5 End Stage CKD (GFR <15 mL/min/1.73 square meters)  Note: GFR calculation is accurate only with a steady state creatinine   HS TROPONIN I 0HR - Normal    hs TnI 0hr 7  \"Refer to ACS Flowchart\"- see link ng/L Final    Comment:                                              Initial (time 0) result  If >=50 ng/L, Myocardial injury suggested ;  Type of myocardial injury and treatment strategy  to be determined.  If 5-49 ng/L, a delta result at 2 hours and or 4 hours will be needed to further evaluate.  If <4 ng/L, and chest pain has been >3 hours since onset, patient may qualify for discharge based on the HEART score in the ED.  If <5 ng/L and <3hours since onset of chest pain, a delta result at 2 hours will be needed to further evaluate.    HS Troponin 99th Percentile URL of a Health Population=12 ng/L with a 95% Confidence Interval of 8-18 ng/L.    Second Troponin (time 2 hours)  If calculated delta >= 20 ng/L,  Myocardial injury suggested ; Type of myocardial injury and treatment strategy to be determined.  If 5-49 ng/L and the calculated delta is 5-19 ng/L, consult medical service for evaluation.  Continue evaluation for ischemia on ecg and other possible etiology and repeat hs troponin at 4 hours.  If delta is <5 ng/L at 2 hours, consider discharge based on risk stratification via the HEART score (if in ED), or MIAH risk score in IP/Observation.    HS Troponin 99th Percentile URL of a Health Population=12 ng/L with a 95% Confidence Interval of 8-18 ng/L.       Labs reviewed by me are significant for:  No significant lab abnormalities.    Treatment and Disposition  ED course: Patient remained " hemodynamically stable in the emergency department.  Evaluation for trauma was unremarkable.  Lab work for weakness was unremarkable.  Recommended resources for outpatient follow-up.  Social work will be contacted the next business day to see if they could assist with resources at home.  Shared decision making: Patient's daughter is the primary caregiver and agrees with plan.  Code status: Full code.              Critical Care Time  Procedures

## 2024-09-02 NOTE — ED PROVIDER NOTES
History  Chief Complaint   Patient presents with    Fall     Fall from standing last night at 7pm, hit head on wooden bed frame, had lac to right side of forehead in hairline. No loc no vomiting. Hx of dementia family worried as dementia harder to tell if she's not at baseline and that her muscles seem weaker. Denies blood thinners     69 year old female with past medical history of dementia presents to the ED for evaluation of head laceration status post fall yesterday.  Patient lives at home with her daughter and son-in-law. Son-in-law heard patient fall in her room last night and found patient in her room with a laceration on the right side of forehead.  Patient was at baseline last night so they decided to not bring patient to the emergency room.  However today they noted that patient appears weaker than normal.  Patient eats puréed food at baseline.  Patient does not communicate at baseline. No fever, chills, n/v/d      History provided by:  Patient  Fall  Associated symptoms: no abdominal pain, no back pain, no chest pain, no seizures and no vomiting        Prior to Admission Medications   Prescriptions Last Dose Informant Patient Reported? Taking?   levETIRAcetam (KEPPRA) 500 mg tablet   No No   Sig: Take 1 tablet (500 mg total) by mouth every 12 (twelve) hours for 11 doses   sertraline (ZOLOFT) 25 mg tablet   No No   Sig: Take 1 tablet (25 mg total) by mouth daily      Facility-Administered Medications: None       Past Medical History:   Diagnosis Date    Dementia (HCC)     Difficulty in swallowing        History reviewed. No pertinent surgical history.    Family History   Problem Relation Age of Onset    Dementia Mother      I have reviewed and agree with the history as documented.    E-Cigarette/Vaping     E-Cigarette/Vaping Substances    Nicotine No     THC No     CBD No     Flavoring No     Other No     Unknown No      Social History     Tobacco Use    Smoking status: Never    Smokeless tobacco: Never         Review of Systems   Constitutional:  Negative for chills and fever.        Fall   HENT:  Negative for ear pain and sore throat.    Eyes:  Negative for pain and visual disturbance.   Respiratory:  Negative for cough and shortness of breath.    Cardiovascular:  Negative for chest pain and palpitations.   Gastrointestinal:  Negative for abdominal pain and vomiting.   Genitourinary:  Negative for dysuria and hematuria.   Musculoskeletal:  Negative for arthralgias and back pain.   Skin:  Positive for wound. Negative for color change and rash.   Neurological:  Negative for seizures and syncope.   All other systems reviewed and are negative.      Physical Exam  ED Triage Vitals [09/02/24 1605]   Temperature Pulse Respirations Blood Pressure SpO2   97.9 °F (36.6 °C) 87 18 118/91 92 %      Temp Source Heart Rate Source Patient Position - Orthostatic VS BP Location FiO2 (%)   Temporal Monitor Sitting Left arm --      Pain Score       No Pain             Orthostatic Vital Signs  Vitals:    09/02/24 1605 09/02/24 1730 09/02/24 1815   BP: 118/91     Pulse: 87 84 82   Patient Position - Orthostatic VS: Sitting         Physical Exam  Vitals and nursing note reviewed.   Constitutional:       General: She is not in acute distress.     Appearance: Normal appearance. She is well-developed. She is ill-appearing. She is not toxic-appearing or diaphoretic.      Comments: Cachectic, severely underweight.   Pt not able to participate in neuro exam. Pt intermittently grabs and squeeze my hand. Pt intermittently repeats her daughter's single words.      HENT:      Head: Normocephalic and atraumatic.        Right Ear: External ear normal.      Left Ear: External ear normal.      Nose: Nose normal.      Mouth/Throat:      Mouth: Mucous membranes are moist.   Eyes:      General: No scleral icterus.        Right eye: No discharge.      Extraocular Movements: Extraocular movements intact.      Conjunctiva/sclera: Conjunctivae normal.    Cardiovascular:      Rate and Rhythm: Normal rate and regular rhythm.      Pulses: Normal pulses.      Heart sounds: No murmur heard.  Pulmonary:      Effort: Pulmonary effort is normal. No respiratory distress.      Breath sounds: Normal breath sounds. No wheezing.   Chest:      Chest wall: No tenderness.   Abdominal:      General: Abdomen is flat. There is no distension.      Palpations: Abdomen is soft.      Tenderness: There is no abdominal tenderness.   Musculoskeletal:         General: No swelling, deformity or signs of injury. Normal range of motion.      Cervical back: Normal range of motion and neck supple. No rigidity or tenderness.      Right lower leg: No edema.      Left lower leg: No edema.   Skin:     General: Skin is warm and dry.      Capillary Refill: Capillary refill takes less than 2 seconds.   Neurological:      General: No focal deficit present.      Mental Status: She is alert and oriented to person, place, and time.   Psychiatric:         Mood and Affect: Mood normal.         ED Medications  Medications - No data to display    Diagnostic Studies  Results Reviewed       Procedure Component Value Units Date/Time    HS Troponin 0hr (reflex protocol) [[id number]]  (Normal) Collected: 09/02/24 1724    Lab Status: Final result Specimen: Blood from Arm, Left Updated: 09/02/24 1757     hs TnI 0hr 7 ng/L     Comprehensive metabolic panel [[id number]]  (Abnormal) Collected: 09/02/24 1724    Lab Status: Final result Specimen: Blood from Arm, Left Updated: 09/02/24 1751     Sodium 139 mmol/L      Potassium 4.1 mmol/L      Chloride 99 mmol/L      CO2 34 mmol/L      ANION GAP 6 mmol/L      BUN 13 mg/dL      Creatinine 0.31 mg/dL      Glucose 132 mg/dL      Calcium 8.7 mg/dL      AST 10 U/L      ALT 6 U/L      Alkaline Phosphatase 44 U/L      Total Protein 6.6 g/dL      Albumin 3.5 g/dL      Total Bilirubin 0.23 mg/dL      eGFR 117 ml/min/1.73sq m     Narrative:      [employer]  guidelines for Chronic Kidney Disease (CKD):     Stage 1 with normal or high GFR (GFR > 90 mL/min/1.73 square meters)    Stage 2 Mild CKD (GFR = 60-89 mL/min/1.73 square meters)    Stage 3A Moderate CKD (GFR = 45-59 mL/min/1.73 square meters)    Stage 3B Moderate CKD (GFR = 30-44 mL/min/1.73 square meters)    Stage 4 Severe CKD (GFR = 15-29 mL/min/1.73 square meters)    Stage 5 End Stage CKD (GFR <15 mL/min/1.73 square meters)  Note: GFR calculation is accurate only with a steady state creatinine    CBC and differential [[id number]]  (Abnormal) Collected: 09/02/24 1724    Lab Status: Final result Specimen: Blood from Arm, Left Updated: 09/02/24 1733     WBC 11.78 Thousand/uL      RBC 4.03 Million/uL      Hemoglobin 12.1 g/dL      Hematocrit 37.6 %      MCV 93 fL      MCH 30.0 pg      MCHC 32.2 g/dL      RDW 14.1 %      MPV 8.6 fL      Platelets 515 Thousands/uL      nRBC 0 /100 WBCs      Segmented % 83 %      Immature Grans % 0 %      Lymphocytes % 12 %      Monocytes % 4 %      Eosinophils Relative 0 %      Basophils Relative 1 %      Absolute Neutrophils 9.72 Thousands/µL      Absolute Immature Grans 0.05 Thousand/uL      Absolute Lymphocytes 1.42 Thousands/µL      Absolute Monocytes 0.50 Thousand/µL      Eosinophils Absolute 0.03 Thousand/µL      Basophils Absolute 0.06 Thousands/µL                    CT head without contrast   ED Interpretation by Biju Medina MD (09/02 1821)   No acute intracranial bleed.      Final Result by Pallav N Shah, MD (09/02 1938)      No acute intracranial hemorrhage.      Commensurate ventriculomegaly to the age advanced frontotemporal parenchymal volume loss. Correlate for neurodegenerative disease.      Mild chronic microangiopathic changes.                  Workstation performed: [id number]         CT spine cervical without contrast   ED Interpretation by Biju Medina MD (09/02 1820)   No fracture.      Final Result by Pallav N Shah, MD (09/02 1941)      No cervical  spine fracture or traumatic malalignment.                  Workstation performed: [id number]               Procedures  ECG 12 Lead Documentation Only    Date/Time: 9/2/2024 7:56 PM    Performed by: Aryan Haney DO  Authorized by: Aryan Haney DO    Rate:     ECG rate:  84    ECG rate assessment: normal    Rhythm:     Rhythm: sinus rhythm    Ectopy:     Ectopy: none    QRS:     QRS axis:  Normal    QRS intervals:  Normal  Conduction:     Conduction: normal    ST segments:     ST segments:  Non-specific  T waves:     T waves: non-specific          ED Course  ED Course as of 09/05/24 1844   Mon Sep 02, 2024   1651 Contact case management during the weekday    1803 Sodium: 139   1803 Potassium: 4.1   1803 BUN: 13   1803 Creatinine(!): 0.31   1941 CT head without contrast  IMPRESSION:     No acute intracranial hemorrhage.     Commensurate ventriculomegaly to the age advanced frontotemporal parenchymal volume loss. Correlate for neurodegenerative disease.     Mild chronic microangiopathic changes.     Thu Sep 05, 2024   1843 Contacted Case management today to reach out to pt however contact information is not up to date.                              SBIRT 22yo+      Flowsheet Row Most Recent Value   Initial Alcohol Screen: US AUDIT-C     1. How often do you have a drink containing alcohol? 0 Filed at: 09/02/2024 1607   Audit-C Score 0 Filed at: 09/02/2024 1607   OSBALDO: How many times in the past year have you...    Used an illegal drug or used a prescription medication for non-medical reasons? Never Filed at: 09/02/2024 1607                  Medical Decision Making  69 year old female with past medical history of dementia presents to the ED for evaluation of head laceration status post fall yesterday.    Ddx: ACS, arrhythmia, dehydration, FTT, electrolyte imbalance, unlikely infectious   Will evaluate with cbc, cmp, and trop, CT head, and cervical CT  Labs significant for bicarb of 34, anion gap of 6. CT negative for acute  findings.  Daughter instructed to contact aging and adult services. Pt confirmed that the contact information was up to date.  Daughter notes she prefers to take pt home. Pt d/c home with strict return precaution.      Amount and/or Complexity of Data Reviewed  Labs: ordered. Decision-making details documented in ED Course.  Radiology: ordered and independent interpretation performed. Decision-making details documented in ED Course.          Disposition  Final diagnoses:   Fall, initial encounter   Dementia (HCC)   Injury of head, initial encounter     Time reflects when diagnosis was documented in both MDM as applicable and the Disposition within this note       Time User Action Codes Description Comment    9/2/2024  7:42 PM Aryan Haney Add [W19.XXXA] Fall, initial encounter     9/2/2024  7:42 PM VoAryan Add [F03.90] Dementia (HCC)     9/2/2024  7:43 PM VoAryan Add [S09.90XA] Injury of head, initial encounter           ED Disposition       ED Disposition   Discharge    Condition   Stable    Date/Time   Mon Sep 2, 2024 1945    Comment   Jessica Vincent discharge to home/self care.                   Follow-up Information       Follow up With Specialties Details Why Contact Info Additional Information    [facility] Internal Medicine Schedule an appointment as soon as possible for a visit   [address] [place] [phone]  [phone] [facility], [address] [place] [address], [phone]            Discharge Medication List as of 9/2/2024  8:12 PM        CONTINUE these medications which have NOT CHANGED    Details   levETIRAcetam (KEPPRA) 500 mg tablet Take 1 tablet (500 mg total) by mouth every 12 (twelve) hours for 11 doses, Starting Thu 2/15/2024, Until Wed 2/21/2024, Normal      sertraline (ZOLOFT) 25 mg tablet Take 1 tablet (25 mg total) by mouth daily, Starting Fri 2/16/2024, Until Tue 4/16/2024, Normal            No discharge procedures on file.    PDMP Review       None             ED Provider  Attending physically available and evaluated Jessica Vincent. I managed the patient along with the ED Attending.    Electronically Signed by           Aryan Haney DO  09/05/24 2468

## 2024-09-02 NOTE — DISCHARGE INSTRUCTIONS
You were evaluated in the Emergency Department today for evaluation after fall with head injury.     Please schedule an appointment with your primary care physician within the next 2-3 days. Contact [facility] agency on aging [phone] for assistance with home care.    Return to the Emergency Department if you experience worsening or uncontrolled pain, fevers 100.4°F or greater, recurrent vomiting, inability to tolerate food or fluids by mouth, bloody stools or vomit, black or tarry stools, or any other concerning symptoms.    Thank you for choosing us for your care.

## 2024-09-05 NOTE — CASE MANAGEMENT
Case Management Discharge Planning Note    Patient name Jessica Vincent  Location ED 21/ED 21 MRN [id number]  : 1955 Date 2024       Current Admission Date: 2024  Current Admission Diagnosis:Unspecified multiple injuries, initial encounter   Patient Active Problem List    Diagnosis Date Noted Date Diagnosed    Fall 2024     ICH (intracerebral hemorrhage) (HCC) 2024     Dementia (HCC) 2024     SAH (subarachnoid hemorrhage) (HCC) 2024       LOS (days): 0  Geometric Mean LOS (GMLOS) (days):   Days to GMLOS:     OBJECTIVE:            Current admission status: Emergency   Preferred Pharmacy:   [facility]/pharmacy #[id number] - [place], [place] - [address] [place] [id number]  Phone: [phone] Fax: [phone]    Primary Care Provider: No primary care provider on file.    Primary Insurance:   Secondary Insurance:     DISCHARGE DETAILS:               Additional Comments: Cm received Secure Chat from ED MD Dr Aryan Haney that daughter is requesting assistance with resources for patient.  Dr Haney informed CM patient has dementia and is uninsured and not a citizen.  CM attempted to reach daughter at listed numbers in the chart.  None of the numbers were in service and no VM was able to be left for f/u.  Dr Haney was made aware of the situation via Secure Chat.  CM to be available